# Patient Record
Sex: FEMALE | Race: WHITE | Employment: OTHER | URBAN - METROPOLITAN AREA
[De-identification: names, ages, dates, MRNs, and addresses within clinical notes are randomized per-mention and may not be internally consistent; named-entity substitution may affect disease eponyms.]

---

## 2017-02-07 ENCOUNTER — APPOINTMENT (OUTPATIENT)
Dept: CT IMAGING | Facility: HOSPITAL | Age: 82
End: 2017-02-07
Attending: EMERGENCY MEDICINE
Payer: MEDICARE

## 2017-02-07 ENCOUNTER — PRIOR ORIGINAL RECORDS (OUTPATIENT)
Dept: OTHER | Age: 82
End: 2017-02-07

## 2017-02-07 ENCOUNTER — APPOINTMENT (OUTPATIENT)
Dept: CV DIAGNOSTICS | Facility: HOSPITAL | Age: 82
End: 2017-02-07
Attending: HOSPITALIST
Payer: MEDICARE

## 2017-02-07 ENCOUNTER — HOSPITAL ENCOUNTER (OUTPATIENT)
Facility: HOSPITAL | Age: 82
Setting detail: OBSERVATION
Discharge: HOME | End: 2017-02-08
Attending: EMERGENCY MEDICINE | Admitting: INTERNAL MEDICINE
Payer: MEDICARE

## 2017-02-07 ENCOUNTER — APPOINTMENT (OUTPATIENT)
Dept: GENERAL RADIOLOGY | Facility: HOSPITAL | Age: 82
End: 2017-02-07
Payer: MEDICARE

## 2017-02-07 DIAGNOSIS — R55 SYNCOPE, UNSPECIFIED SYNCOPE TYPE: Primary | ICD-10-CM

## 2017-02-07 PROBLEM — Z91.81 AT RISK FOR FALLING: Status: ACTIVE | Noted: 2017-02-07

## 2017-02-07 LAB
ALBUMIN SERPL-MCNC: 3.2 G/DL (ref 3.5–4.8)
ALP LIVER SERPL-CCNC: 74 U/L (ref 55–142)
ALT SERPL-CCNC: 42 U/L (ref 14–54)
AST SERPL-CCNC: 30 U/L (ref 15–41)
ATRIAL RATE: 53 BPM
BASOPHILS # BLD AUTO: 0.03 X10(3) UL (ref 0–0.1)
BASOPHILS NFR BLD AUTO: 0.5 %
BILIRUB SERPL-MCNC: 1.1 MG/DL (ref 0.1–2)
BILIRUB UR QL STRIP.AUTO: NEGATIVE
BUN BLD-MCNC: 33 MG/DL (ref 8–20)
CALCIUM BLD-MCNC: 9.6 MG/DL (ref 8.3–10.3)
CHLORIDE: 106 MMOL/L (ref 101–111)
CO2: 30 MMOL/L (ref 22–32)
COLOR UR AUTO: YELLOW
CREAT BLD-MCNC: 1.41 MG/DL (ref 0.55–1.02)
EOSINOPHIL # BLD AUTO: 0.14 X10(3) UL (ref 0–0.3)
EOSINOPHIL NFR BLD AUTO: 2.4 %
ERYTHROCYTE [DISTWIDTH] IN BLOOD BY AUTOMATED COUNT: 13.2 % (ref 11.5–16)
GLUCOSE BLD-MCNC: 135 MG/DL (ref 70–99)
GLUCOSE UR STRIP.AUTO-MCNC: NEGATIVE MG/DL
HCT VFR BLD AUTO: 40.2 % (ref 34–50)
HGB BLD-MCNC: 13.1 G/DL (ref 12–16)
IMMATURE GRANULOCYTE COUNT: 0.02 X10(3) UL (ref 0–1)
IMMATURE GRANULOCYTE RATIO %: 0.3 %
KETONES UR STRIP.AUTO-MCNC: NEGATIVE MG/DL
LYMPHOCYTES # BLD AUTO: 2.09 X10(3) UL (ref 0.9–4)
LYMPHOCYTES NFR BLD AUTO: 36 %
M PROTEIN MFR SERPL ELPH: 6.4 G/DL (ref 6.1–8.3)
MCH RBC QN AUTO: 30.7 PG (ref 27–33.2)
MCHC RBC AUTO-ENTMCNC: 32.6 G/DL (ref 31–37)
MCV RBC AUTO: 94.1 FL (ref 81–100)
MONOCYTES # BLD AUTO: 0.26 X10(3) UL (ref 0.1–0.6)
MONOCYTES NFR BLD AUTO: 4.5 %
NEUTROPHIL ABS PRELIM: 3.27 X10 (3) UL (ref 1.3–6.7)
NEUTROPHILS # BLD AUTO: 3.27 X10(3) UL (ref 1.3–6.7)
NEUTROPHILS NFR BLD AUTO: 56.3 %
NITRITE UR QL STRIP.AUTO: POSITIVE
P AXIS: 63 DEGREES
P-R INTERVAL: 166 MS
PH UR STRIP.AUTO: 7 [PH] (ref 4.5–8)
PLATELET # BLD AUTO: 300 10(3)UL (ref 150–450)
POTASSIUM SERPL-SCNC: 3.8 MMOL/L (ref 3.6–5.1)
PROT UR STRIP.AUTO-MCNC: NEGATIVE MG/DL
Q-T INTERVAL: 452 MS
QRS DURATION: 102 MS
QTC CALCULATION (BEZET): 424 MS
R AXIS: -56 DEGREES
RBC # BLD AUTO: 4.27 X10(6)UL (ref 3.8–5.1)
RBC #/AREA URNS AUTO: >10 /HPF
RBC UR QL AUTO: NEGATIVE
RED CELL DISTRIBUTION WIDTH-SD: 45.8 FL (ref 35.1–46.3)
SODIUM SERPL-SCNC: 141 MMOL/L (ref 136–144)
SP GR UR STRIP.AUTO: 1.01 (ref 1–1.03)
T AXIS: 65 DEGREES
TROPONIN: <0.046 NG/ML (ref ?–0.05)
UROBILINOGEN UR STRIP.AUTO-MCNC: <2 MG/DL
VENTRICULAR RATE: 53 BPM
WBC # BLD AUTO: 5.8 X10(3) UL (ref 4–13)
WBC #/AREA URNS AUTO: >50 /HPF

## 2017-02-07 PROCEDURE — 71010 XR CHEST AP PORTABLE  (CPT=71010): CPT

## 2017-02-07 PROCEDURE — 70450 CT HEAD/BRAIN W/O DYE: CPT

## 2017-02-07 PROCEDURE — 93306 TTE W/DOPPLER COMPLETE: CPT | Performed by: INTERNAL MEDICINE

## 2017-02-07 PROCEDURE — 93306 TTE W/DOPPLER COMPLETE: CPT

## 2017-02-07 PROCEDURE — 99220 INITIAL OBSERVATION CARE,LEVL III: CPT | Performed by: HOSPITALIST

## 2017-02-07 RX ORDER — MAGNESIUM OXIDE 400 MG (241.3 MG MAGNESIUM) TABLET
400 TABLET DAILY
COMMUNITY

## 2017-02-07 RX ORDER — ATORVASTATIN CALCIUM 10 MG/1
10 TABLET, FILM COATED ORAL DAILY
Status: DISCONTINUED | OUTPATIENT
Start: 2017-02-08 | End: 2017-02-08

## 2017-02-07 RX ORDER — FELODIPINE 10 MG/1
10 TABLET, EXTENDED RELEASE ORAL DAILY
Status: ON HOLD | COMMUNITY
End: 2017-02-08

## 2017-02-07 RX ORDER — OMEGA-3S/DHA/EPA/FISH OIL 980-1400MG
1000 CAPSULE,DELAYED RELEASE (ENTERIC COATED) ORAL DAILY
COMMUNITY

## 2017-02-07 RX ORDER — FLUOROMETHOLONE 0.1 %
1 SUSPENSION, DROPS(FINAL DOSAGE FORM)(ML) OPHTHALMIC (EYE) 2 TIMES DAILY
Status: DISCONTINUED | OUTPATIENT
Start: 2017-02-07 | End: 2017-02-08

## 2017-02-07 RX ORDER — ENOXAPARIN SODIUM 100 MG/ML
30 INJECTION SUBCUTANEOUS DAILY
Status: DISCONTINUED | OUTPATIENT
Start: 2017-02-08 | End: 2017-02-08

## 2017-02-07 RX ORDER — BENAZEPRIL HYDROCHLORIDE AND HYDROCHLOROTHIAZIDE 20; 12.5 MG/1; MG/1
1 TABLET ORAL DAILY
Status: ON HOLD | COMMUNITY
End: 2017-02-08

## 2017-02-07 RX ORDER — ATORVASTATIN CALCIUM 10 MG/1
10 TABLET, FILM COATED ORAL EVERY EVENING
COMMUNITY

## 2017-02-07 RX ORDER — SODIUM CHLORIDE 9 MG/ML
INJECTION, SOLUTION INTRAVENOUS CONTINUOUS
Status: DISCONTINUED | OUTPATIENT
Start: 2017-02-07 | End: 2017-02-08

## 2017-02-07 RX ORDER — FLUOROMETHOLONE 0.1 %
1 SUSPENSION, DROPS(FINAL DOSAGE FORM)(ML) OPHTHALMIC (EYE) 2 TIMES DAILY
COMMUNITY

## 2017-02-07 RX ORDER — METOPROLOL SUCCINATE 25 MG/1
25 TABLET, EXTENDED RELEASE ORAL DAILY
Status: ON HOLD | COMMUNITY
End: 2017-02-08

## 2017-02-07 RX ORDER — POTASSIUM CHLORIDE 20 MEQ/1
40 TABLET, EXTENDED RELEASE ORAL ONCE
Status: COMPLETED | OUTPATIENT
Start: 2017-02-07 | End: 2017-02-07

## 2017-02-07 RX ORDER — MULTIVITAMIN/IRON/FOLIC ACID 18MG-0.4MG
1 TABLET ORAL DAILY
COMMUNITY
End: 2019-01-11

## 2017-02-07 RX ORDER — ONDANSETRON 2 MG/ML
4 INJECTION INTRAMUSCULAR; INTRAVENOUS EVERY 4 HOURS PRN
Status: DISCONTINUED | OUTPATIENT
Start: 2017-02-07 | End: 2017-02-08

## 2017-02-07 RX ORDER — SODIUM CHLORIDE 9 MG/ML
INJECTION, SOLUTION INTRAVENOUS ONCE
Status: COMPLETED | OUTPATIENT
Start: 2017-02-07 | End: 2017-02-07

## 2017-02-07 RX ORDER — DEXTROSE AND SODIUM CHLORIDE 5; .45 G/100ML; G/100ML
INJECTION, SOLUTION INTRAVENOUS CONTINUOUS
Status: ACTIVE | OUTPATIENT
Start: 2017-02-07 | End: 2017-02-07

## 2017-02-07 NOTE — ED INITIAL ASSESSMENT (HPI)
Pt had flu like symptoms 2 weeks ago and felt lightheaded and passed out for just a couple of seconds and then when being carried to her bed she had another syncopal event. That episode lasted a couple seconds as well.   Pt had another episode today while g

## 2017-02-07 NOTE — CONSULTS
[facility]  Report of Consultation    Sam Piña Patient Status:  Observation    1932 MRN [id number]   [facility] 0SW-A Attending Chong Parsons, DO   Hosp Day # 0 PCP None Pcp     Reason for Co dextrose 5 %-0.45 % NaCl infusion, , Intravenous, Continuous  •  ondansetron HCl (ZOFRAN) injection 4 mg, 4 mg, Intravenous, Q4H PRN  •  [START ON 2/8/2017] Atorvastatin Calcium (LIPITOR) tab 10 mg, 10 mg, Oral, Daily  •  fluorometholone (FML LIQUIFILM) 0.

## 2017-02-07 NOTE — H&P
[facility]  History and Physical     Erleen Ill Patient Status:  Observation    1932 MRN [id number]   [facility] 0SW-A Attending Leighann Thomas, DO   Hosp Day # 0 PCP None Pcp     Chief Complaint: syncope    History of P to encounter. Review of Systems:   A comprehensive 14 point review of systems was completed. Pertinent positives and negatives noted in the HPI. Physical Exam:    /54 mmHg  Pulse 56  Resp 16  SpO2 97%  General: No acute distress.  Alert and o

## 2017-02-07 NOTE — PLAN OF CARE
Patient admitted from ED  Aox4 in NAD; very [address]; deaf rt ear; hearing aid left ear  Orthostatics       Flat 137/46 HR 52       Sit     131/51 HR 59      Stand 112/46 HR 62; denies dizziness    Admit database completed  Patient verbalizes how sad she has be

## 2017-02-08 ENCOUNTER — APPOINTMENT (OUTPATIENT)
Dept: CV DIAGNOSTICS | Facility: HOSPITAL | Age: 82
End: 2017-02-08
Attending: INTERNAL MEDICINE
Payer: MEDICARE

## 2017-02-08 VITALS
BODY MASS INDEX: 18.82 KG/M2 | DIASTOLIC BLOOD PRESSURE: 49 MMHG | HEIGHT: 64 IN | TEMPERATURE: 98 F | RESPIRATION RATE: 18 BRPM | HEART RATE: 44 BPM | SYSTOLIC BLOOD PRESSURE: 135 MMHG | WEIGHT: 110.25 LBS | OXYGEN SATURATION: 96 %

## 2017-02-08 LAB
BUN BLD-MCNC: 23 MG/DL (ref 8–20)
CALCIUM BLD-MCNC: 8.6 MG/DL (ref 8.3–10.3)
CHLORIDE: 113 MMOL/L (ref 101–111)
CO2: 23 MMOL/L (ref 22–32)
CREAT BLD-MCNC: 1.07 MG/DL (ref 0.55–1.02)
GLUCOSE BLD-MCNC: 79 MG/DL (ref 70–99)
POTASSIUM SERPL-SCNC: 4.2 MMOL/L (ref 3.6–5.1)
SODIUM SERPL-SCNC: 143 MMOL/L (ref 136–144)

## 2017-02-08 PROCEDURE — 93017 CV STRESS TEST TRACING ONLY: CPT

## 2017-02-08 PROCEDURE — 99217 OBSERVATION CARE DISCHARGE: CPT | Performed by: HOSPITALIST

## 2017-02-08 PROCEDURE — 78452 HT MUSCLE IMAGE SPECT MULT: CPT

## 2017-02-08 PROCEDURE — 93018 CV STRESS TEST I&R ONLY: CPT | Performed by: INTERNAL MEDICINE

## 2017-02-08 RX ORDER — METOPROLOL SUCCINATE 25 MG/1
25 TABLET, EXTENDED RELEASE ORAL DAILY
Refills: 0 | Status: ON HOLD | COMMUNITY
Start: 2017-02-08 | End: 2017-04-19

## 2017-02-08 NOTE — PLAN OF CARE
Maintains optimal cardiac output and hemodynamic stability Progressing      Absence of cardiac arrhythmias or at baseline Progressing      Back from stress test, pt awake,alert  Left hearing aid noted, right ear deaf  Denies c/p, no sob  SB on the monitor

## 2017-02-08 NOTE — PLAN OF CARE
HR drop to 39 per MT  Landon IRENE made aware, will review the rhythm strip  Spoke with Jus Smith from cardiographics   No available 30 day event monitor for pt today  Continue to monitor

## 2017-02-08 NOTE — PROGRESS NOTES
[facility] Pharmacy Note:  Renal Dose Adjustment    Zoie Wood has been prescribed enoxaparin (LOVENOX) 40 mg subcutaneously every 24 hours. Estimated Creatinine Clearance: 23 mL/min (based on Cr of 1.41).     Her calculated creatinine clearance is <30 m

## 2017-02-08 NOTE — PLAN OF CARE
NURSING DISCHARGE NOTE    Discharged Home via Wheelchair. Accompanied by Support staff  Belongings Taken by patient/family.   D/c instructions given to pt and daughter,verbalized understanding  OP 30 day event monitor, scheduling # given to pt [phone]

## 2017-02-08 NOTE — PROGRESS NOTES
CARDIODIAGNOSTICS PRELIMINARY REPORT    Lexiscan Nuclear Stress Test    Patient denied cardiac symptoms with Lexiscan infusion. No significant EKG changes or arrhytmias noted. Nuclear images pending.

## 2017-02-08 NOTE — PROGRESS NOTES
[facility]  Cardiology Progress Note    Subjective:  No chest pain or shortness of breath. Denies any dizziness or lightheadedness.     Objective:  /57 mmHg  Pulse 55  Temp(Src) 97.3 °F (36.3 °C) (Oral)  Resp 18  Ht 5' 4\" (1.626 m)  Wt 110 lb 3 outlined above.     TETO Cuevas

## 2017-02-09 NOTE — DISCHARGE SUMMARY
[facility] HOSPITALIST  DISCHARGE SUMMARY     Cheryl Yu Patient Status:  Observation    1932 MRN [id number]   [facility] 0SW-A Attending No att. providers found   Hosp Day # 1 PCP None Pcp     Date of Admission: 2017  Date of Mariel Villalta injury which is felt to be prerenal in etiology. She was started on IV fluids with resolution of her kidney dysfunction. It is felt that the patient's syncopal episode is likely secondary to dehydration.   She recently had a viral URI and had decreased or known as:  PLENDIL                 Follow-up appointment: Minda Russ MD  [address] [phone]    Schedule an appointment as soon as possible for a visit in 2 weeks  see APN in offic

## 2017-02-17 ENCOUNTER — HOSPITAL ENCOUNTER (OUTPATIENT)
Dept: CV DIAGNOSTICS | Facility: HOSPITAL | Age: 82
Discharge: HOME | End: 2017-02-17
Attending: INTERNAL MEDICINE
Payer: MEDICARE

## 2017-02-17 DIAGNOSIS — R55 SYNCOPE, UNSPECIFIED SYNCOPE TYPE: ICD-10-CM

## 2017-02-17 PROCEDURE — 93272 ECG/REVIEW INTERPRET ONLY: CPT | Performed by: INTERNAL MEDICINE

## 2017-02-17 PROCEDURE — 93270 REMOTE 30 DAY ECG REV/REPORT: CPT

## 2017-02-21 ENCOUNTER — MYAURORA ACCOUNT LINK (OUTPATIENT)
Dept: OTHER | Age: 82
End: 2017-02-21

## 2017-02-21 ENCOUNTER — PRIOR ORIGINAL RECORDS (OUTPATIENT)
Dept: OTHER | Age: 82
End: 2017-02-21

## 2017-02-24 LAB
ALBUMIN: 3.2 G/DL
ALKALINE PHOSPHATATE(ALK PHOS): 74 IU/L
BILIRUBIN TOTAL: 1.1 MG/DL
BUN: 33 MG/DL
CALCIUM: 9.6 MG/DL
CHLORIDE: 106 MEQ/L
CREATININE, SERUM: 1.41 MG/DL
GLUCOSE: 135 MG/DL
HEMATOCRIT: 40.2 %
HEMOGLOBIN: 13.1 G/DL
PLATELETS: 300 K/UL
POTASSIUM, SERUM: 3.8 MEQ/L
PROTEIN, TOTAL: 6.4 G/DL
RED BLOOD COUNT: 4.27 X 10-6/U
SGOT (AST): 30 IU/L
SGPT (ALT): 42 IU/L
SODIUM: 141 MEQ/L
WHITE BLOOD COUNT: 5.8 X 10-3/U

## 2017-02-27 ENCOUNTER — PRIOR ORIGINAL RECORDS (OUTPATIENT)
Dept: OTHER | Age: 82
End: 2017-02-27

## 2017-03-24 ENCOUNTER — PRIOR ORIGINAL RECORDS (OUTPATIENT)
Dept: OTHER | Age: 82
End: 2017-03-24

## 2017-03-29 ENCOUNTER — HOSPITAL ENCOUNTER (OUTPATIENT)
Dept: CT IMAGING | Facility: HOSPITAL | Age: 82
Discharge: HOME | End: 2017-03-29
Attending: INTERNAL MEDICINE
Payer: MEDICARE

## 2017-03-29 DIAGNOSIS — R55 SYNCOPE, UNSPECIFIED SYNCOPE TYPE: ICD-10-CM

## 2017-03-29 PROCEDURE — 70450 CT HEAD/BRAIN W/O DYE: CPT

## 2017-03-30 ENCOUNTER — PRIOR ORIGINAL RECORDS (OUTPATIENT)
Dept: OTHER | Age: 82
End: 2017-03-30

## 2017-03-31 ENCOUNTER — PRIOR ORIGINAL RECORDS (OUTPATIENT)
Dept: OTHER | Age: 82
End: 2017-03-31

## 2017-04-05 ENCOUNTER — OFFICE VISIT (OUTPATIENT)
Dept: NEUROLOGY | Facility: CLINIC | Age: 82
End: 2017-04-05

## 2017-04-05 VITALS
SYSTOLIC BLOOD PRESSURE: 176 MMHG | DIASTOLIC BLOOD PRESSURE: 80 MMHG | WEIGHT: 116 LBS | BODY MASS INDEX: 21.35 KG/M2 | HEIGHT: 62 IN | HEART RATE: 60 BPM | RESPIRATION RATE: 16 BRPM

## 2017-04-05 DIAGNOSIS — M54.2 NECK PAIN: ICD-10-CM

## 2017-04-05 DIAGNOSIS — R55 SYNCOPE, UNSPECIFIED SYNCOPE TYPE: Primary | ICD-10-CM

## 2017-04-05 PROCEDURE — 99204 OFFICE O/P NEW MOD 45 MIN: CPT | Performed by: OTHER

## 2017-04-05 NOTE — PROGRESS NOTES
[facility] CONSULTATION    Date of consult: 4/5/2017    CC: \"heaviness in head\", syncope    HPI: Lynn Riggs is a 80year old female with past medical history as listed below presents here for initial evaluation of \"heaviness sensati EARDRUM,ASPIR Right     TONSILLECTOMY       Social History:     Smoking status: Never Smoker     Smokeless tobacco: Not on file    Alcohol Use: No     Family history:  Patient denies any pertinent family history associated with the current problem    Physi 4:09 PM  None Pcp

## 2017-04-05 NOTE — PATIENT INSTRUCTIONS
Refill policies:    • Allow 2 business days for refills; controlled substances may take longer.   • Contact your pharmacy at least 5 days prior to running out of medication and have them send an electronic request or submit request through the “request re insurance carrier to obtain pre-certification or prior authorization. Unfortunately, [facility] has seen an increase in denial of payment even though the procedure/test has been pre-certified.   You are strongly encouraged to contact your insurance carrier to v

## 2017-04-10 ENCOUNTER — HOSPITAL ENCOUNTER (OUTPATIENT)
Dept: MRI IMAGING | Facility: HOSPITAL | Age: 82
Discharge: HOME | End: 2017-04-10
Attending: Other
Payer: MEDICARE

## 2017-04-10 DIAGNOSIS — M54.2 NECK PAIN: ICD-10-CM

## 2017-04-10 DIAGNOSIS — R55 SYNCOPE, UNSPECIFIED SYNCOPE TYPE: ICD-10-CM

## 2017-04-10 PROCEDURE — 72141 MRI NECK SPINE W/O DYE: CPT

## 2017-04-10 PROCEDURE — 70551 MRI BRAIN STEM W/O DYE: CPT

## 2017-04-12 NOTE — H&P
: 1932  ACCOUNT:  [id number]  [phone]  PCP:      TODAY'S DATE: 2017  DICTATED BY:  Shadia Parra M.D.]    CHIEF COMPLAINT: [Followup of Bradycardia, Followup of Fatigue/malaise and Followup of Syncope.]    HPI:  [On 2017, Solo Mcknight checks her blood pressure and heart rate fairly regularly and states that the majority of her heart rates are in the 40s. She is only on a low-dose beta blocker at this point.   She has not really complained of any recurrent symptoms of syncope since being HEAD/FACE: no trauma and normocephalic. EYES: conjunctivae not injected and no xanthelasma. ENT: mucosa pink and moist. NECK: jugular venous pressure not elevated. RESP: respirations with normal rate and rhythm, clear to auscultation.  GI: no masses, tender procedure, we will increase her beta blocker further, which will also help with blood pressure control. 2. Unclear etiology of the headaches. As stated above, these did not seem to correspond with her arrhythmias on the event monitor.   I am going to ge

## 2017-04-18 ENCOUNTER — APPOINTMENT (OUTPATIENT)
Dept: GENERAL RADIOLOGY | Facility: HOSPITAL | Age: 82
End: 2017-04-18
Attending: INTERNAL MEDICINE
Payer: MEDICARE

## 2017-04-18 ENCOUNTER — HOSPITAL ENCOUNTER (OUTPATIENT)
Dept: INTERVENTIONAL RADIOLOGY/VASCULAR | Facility: HOSPITAL | Age: 82
Discharge: HOME | End: 2017-04-19
Attending: INTERNAL MEDICINE | Admitting: INTERNAL MEDICINE
Payer: MEDICARE

## 2017-04-18 DIAGNOSIS — R53.83 FATIGUE: ICD-10-CM

## 2017-04-18 DIAGNOSIS — R55 SYNCOPE: ICD-10-CM

## 2017-04-18 DIAGNOSIS — R00.1 BRADYCARDIA: ICD-10-CM

## 2017-04-18 DIAGNOSIS — I47.1 SVT (SUPRAVENTRICULAR TACHYCARDIA) (HCC): ICD-10-CM

## 2017-04-18 PROCEDURE — 02HK3JZ INSERTION OF PACEMAKER LEAD INTO RIGHT VENTRICLE, PERCUTANEOUS APPROACH: ICD-10-PCS | Performed by: INTERNAL MEDICINE

## 2017-04-18 PROCEDURE — 02H63JZ INSERTION OF PACEMAKER LEAD INTO RIGHT ATRIUM, PERCUTANEOUS APPROACH: ICD-10-PCS | Performed by: INTERNAL MEDICINE

## 2017-04-18 PROCEDURE — 0JH606Z INSERTION OF PACEMAKER, DUAL CHAMBER INTO CHEST SUBCUTANEOUS TISSUE AND FASCIA, OPEN APPROACH: ICD-10-PCS | Performed by: INTERNAL MEDICINE

## 2017-04-18 PROCEDURE — 99152 MOD SED SAME PHYS/QHP 5/>YRS: CPT | Performed by: INTERNAL MEDICINE

## 2017-04-18 PROCEDURE — 99153 MOD SED SAME PHYS/QHP EA: CPT | Performed by: INTERNAL MEDICINE

## 2017-04-18 PROCEDURE — 71010 XR CHEST AP PORTABLE  (CPT=71010): CPT

## 2017-04-18 PROCEDURE — 33208 INSRT HEART PM ATRIAL & VENT: CPT | Performed by: INTERNAL MEDICINE

## 2017-04-18 RX ORDER — BACITRACIN 50000 [USP'U]/1
INJECTION, POWDER, LYOPHILIZED, FOR SOLUTION INTRAMUSCULAR
Status: COMPLETED
Start: 2017-04-18 | End: 2017-04-18

## 2017-04-18 RX ORDER — MAGNESIUM OXIDE 400 MG (241.3 MG MAGNESIUM) TABLET
400 TABLET DAILY
Status: DISCONTINUED | OUTPATIENT
Start: 2017-04-18 | End: 2017-04-19

## 2017-04-18 RX ORDER — SODIUM CHLORIDE 9 MG/ML
INJECTION, SOLUTION INTRAVENOUS CONTINUOUS
Status: DISCONTINUED | OUTPATIENT
Start: 2017-04-18 | End: 2017-04-19

## 2017-04-18 RX ORDER — ATORVASTATIN CALCIUM 10 MG/1
10 TABLET, FILM COATED ORAL DAILY
Status: DISCONTINUED | OUTPATIENT
Start: 2017-04-18 | End: 2017-04-19

## 2017-04-18 RX ORDER — MIDAZOLAM HYDROCHLORIDE 1 MG/ML
INJECTION INTRAMUSCULAR; INTRAVENOUS
Status: COMPLETED
Start: 2017-04-18 | End: 2017-04-18

## 2017-04-18 RX ORDER — METOPROLOL SUCCINATE 25 MG/1
25 TABLET, EXTENDED RELEASE ORAL DAILY
Status: DISCONTINUED | OUTPATIENT
Start: 2017-04-18 | End: 2017-04-19

## 2017-04-18 RX ORDER — ONDANSETRON 2 MG/ML
4 INJECTION INTRAMUSCULAR; INTRAVENOUS EVERY 6 HOURS PRN
Status: DISCONTINUED | OUTPATIENT
Start: 2017-04-18 | End: 2017-04-19

## 2017-04-18 RX ORDER — LIDOCAINE HYDROCHLORIDE 10 MG/ML
INJECTION, SOLUTION INFILTRATION; PERINEURAL
Status: COMPLETED
Start: 2017-04-18 | End: 2017-04-18

## 2017-04-18 RX ORDER — DIPHENHYDRAMINE HYDROCHLORIDE 50 MG/ML
INJECTION INTRAMUSCULAR; INTRAVENOUS
Status: COMPLETED
Start: 2017-04-18 | End: 2017-04-18

## 2017-04-18 RX ORDER — CHLORHEXIDINE GLUCONATE 4 G/100ML
30 SOLUTION TOPICAL
Status: DISCONTINUED | OUTPATIENT
Start: 2017-04-19 | End: 2017-04-18 | Stop reason: HOSPADM

## 2017-04-18 RX ADMIN — SODIUM CHLORIDE: 9 INJECTION, SOLUTION INTRAVENOUS at 15:00:00

## 2017-04-18 NOTE — PROCEDURES
OPERATION(S) PERFORMED:   1. Dual-chamber pacemaker implant to preserve AV synchrony and prevent pacemaker syndrome. 2. Chest fluoroscopy. 3. Left upper extremity venography.      : Radha Gutierrez MD    INDICATION: 79 y/o female with HTN and sympt The leads were connected to a pulse generator. They were coiled and placed into the pocket along with the pulse generator. The incision was closed in 3 layers using 2-0, 3-0, and 4-0 Vicryl. Steri-Strips were applied followed by a sterile dressing.  The le

## 2017-04-18 NOTE — H&P
[facility]  H&P    Larri Habermann Patient Status:  Outpatient in a Bed    1932 MRN [id number]   Location [address] [facility] Attending Breana Lopez MD   Hosp Day # 0 PCP None Pcp     Assessme data recorded. Wt Readings from Last 3 Encounters:  04/12/17 : 116 lb (52.617 kg)  04/05/17 : 116 lb (52.617 kg)  02/07/17 : 110 lb 3.7 oz (50 kg)      Telemetry: Sinus elaine  General: Alert and oriented in no apparent distress.   HEENT: No focal deficit

## 2017-04-19 ENCOUNTER — APPOINTMENT (OUTPATIENT)
Dept: GENERAL RADIOLOGY | Facility: HOSPITAL | Age: 82
End: 2017-04-19
Attending: INTERNAL MEDICINE
Payer: MEDICARE

## 2017-04-19 VITALS
WEIGHT: 116 LBS | HEART RATE: 61 BPM | TEMPERATURE: 98 F | HEIGHT: 62 IN | DIASTOLIC BLOOD PRESSURE: 70 MMHG | BODY MASS INDEX: 21.35 KG/M2 | OXYGEN SATURATION: 95 % | SYSTOLIC BLOOD PRESSURE: 140 MMHG | RESPIRATION RATE: 18 BRPM

## 2017-04-19 PROCEDURE — 93005 ELECTROCARDIOGRAM TRACING: CPT

## 2017-04-19 PROCEDURE — 71020 XR CHEST PA + LAT CHEST (CPT=71020): CPT

## 2017-04-19 PROCEDURE — 93010 ELECTROCARDIOGRAM REPORT: CPT | Performed by: INTERNAL MEDICINE

## 2017-04-19 RX ORDER — ACETAMINOPHEN AND CODEINE PHOSPHATE 300; 30 MG/1; MG/1
2 TABLET ORAL EVERY 4 HOURS PRN
Status: DISCONTINUED | OUTPATIENT
Start: 2017-04-19 | End: 2017-04-19

## 2017-04-19 RX ORDER — METOPROLOL SUCCINATE 25 MG/1
50 TABLET, EXTENDED RELEASE ORAL DAILY
Refills: 0 | Status: SHIPPED | COMMUNITY
Start: 2017-04-19 | End: 2017-05-12 | Stop reason: DRUGHIGH

## 2017-04-19 RX ORDER — ACETAMINOPHEN AND CODEINE PHOSPHATE 300; 30 MG/1; MG/1
1 TABLET ORAL EVERY 4 HOURS PRN
Status: DISCONTINUED | OUTPATIENT
Start: 2017-04-19 | End: 2017-04-19

## 2017-04-19 RX ADMIN — METOPROLOL SUCCINATE 25 MG: 25 TABLET, EXTENDED RELEASE ORAL at 08:50:00

## 2017-04-19 RX ADMIN — ATORVASTATIN CALCIUM 10 MG: 10 TABLET, FILM COATED ORAL at 08:49:00

## 2017-04-19 RX ADMIN — MAGNESIUM OXIDE 400 MG (241.3 MG MAGNESIUM) TABLET 400 MG: TABLET at 08:49:00

## 2017-04-19 RX ADMIN — ACETAMINOPHEN AND CODEINE PHOSPHATE 1 TABLET: 300; 30 TABLET ORAL at 08:49:00

## 2017-04-19 NOTE — PROGRESS NOTES
[facility] Cardiology Progress Note    Subjective:   Sore at pacer site. Having some mild pain in her L eye, and her head feels heavy.      Objective:  /60 mmHg  Pulse 63  Temp(Src) 97.8 °F (36.6 °C) (Oral)  Resp 18  Ht 5' 2\" (1.575 m)  Wt 116 lb (52.61

## 2017-04-19 NOTE — PLAN OF CARE
S/p PPM placement to left chest.  Pacemaker site with foam dressing intact. No drainage. Surrounding site is soft. Sling is applied to the left arm with limb restrictions in place. Patient and daughter educated limb restrictions.  Patient drowsy and fat

## 2017-04-19 NOTE — PROGRESS NOTES
Care of patient taken over by me at 1800. Left chest dry and intact. Pt. Sitting up in bed eating and drinking. Sling intact to left arm. Awaiting a tele bed for admit.

## 2017-04-19 NOTE — PLAN OF CARE
CARDIOVASCULAR - ADULT    • Maintains optimal cardiac output and hemodynamic stability Adequate for Discharge    • Absence of cardiac arrhythmias or at baseline Adequate for Discharge

## 2017-04-19 NOTE — PLAN OF CARE
NURSING DISCHARGE NOTE    Discharged 1190 via wheelchair. Accompanied by daughter  Belongings all sent. All discharge instructions reviewed. All questions answered.    meiplex removed per orders from [address] Breana Badillo APSAVANNA, site looks C/D/I. mepiplex [place]

## 2017-04-19 NOTE — PROGRESS NOTES
Report given to [email or web address] [facility]. Pt. Transferred to [address] [place] [address] via wheelchair. Bedside handoff given. Left chest dressing remains dry and intact.

## 2017-04-26 ENCOUNTER — MYAURORA ACCOUNT LINK (OUTPATIENT)
Dept: OTHER | Age: 82
End: 2017-04-26

## 2017-04-26 ENCOUNTER — PRIOR ORIGINAL RECORDS (OUTPATIENT)
Dept: OTHER | Age: 82
End: 2017-04-26

## 2017-05-12 ENCOUNTER — OFFICE VISIT (OUTPATIENT)
Dept: NEUROLOGY | Facility: CLINIC | Age: 82
End: 2017-05-12

## 2017-05-12 VITALS
BODY MASS INDEX: 21 KG/M2 | HEART RATE: 70 BPM | DIASTOLIC BLOOD PRESSURE: 72 MMHG | SYSTOLIC BLOOD PRESSURE: 134 MMHG | RESPIRATION RATE: 14 BRPM | WEIGHT: 115 LBS

## 2017-05-12 DIAGNOSIS — M54.2 NECK PAIN: Primary | ICD-10-CM

## 2017-05-12 DIAGNOSIS — R55 SYNCOPE, UNSPECIFIED SYNCOPE TYPE: ICD-10-CM

## 2017-05-12 PROCEDURE — 99215 OFFICE O/P EST HI 40 MIN: CPT | Performed by: OTHER

## 2017-05-12 RX ORDER — METOPROLOL SUCCINATE 50 MG/1
TABLET, EXTENDED RELEASE ORAL DAILY
Refills: 3 | COMMUNITY
Start: 2017-04-26

## 2017-05-12 NOTE — PROGRESS NOTES
Patient here to follow up regarding syncopal episodes. States she has been doing well. Here to discuss test results and next steps.

## 2017-05-12 NOTE — PROGRESS NOTES
[facility] Neurology outpatient progress note  Date of service: 5/12/2017    Patient here for a follow-up visit for \"heaviness in head\", syncope. Since last visit symptoms have resolved, pacemaker was placed. MRI, brain and c spine were unremarkable.   Zoie INCISION EARDRUM,ASPIR Right     TONSILLECTOMY      CARDIAC PACEMAKER PLACEMENT Left 4/18/17    Comment medtronic MRI conditional dual chamber     Social History:     Smoking status: Never Smoker     Smokeless tobacco: Never Used    Alcohol Use: No     Fam

## 2017-08-03 NOTE — PLAN OF CARE
CARDIOVASCULAR - ADULT    • Maintains optimal cardiac output and hemodynamic stability Progressing    • Absence of cardiac arrhythmias or at baseline Progressing              A/o x3 ? Forgetfullness. [address]  Lives with daughter. Daughter at bedside.    Up and Internal Medicine

## 2017-09-15 ENCOUNTER — PRIOR ORIGINAL RECORDS (OUTPATIENT)
Dept: OTHER | Age: 82
End: 2017-09-15

## 2017-12-29 ENCOUNTER — MYAURORA ACCOUNT LINK (OUTPATIENT)
Dept: OTHER | Age: 82
End: 2017-12-29

## 2018-05-25 ENCOUNTER — OFFICE VISIT (OUTPATIENT)
Dept: INTERNAL MEDICINE CLINIC | Facility: CLINIC | Age: 83
End: 2018-05-25

## 2018-05-25 VITALS
HEART RATE: 66 BPM | RESPIRATION RATE: 16 BRPM | DIASTOLIC BLOOD PRESSURE: 70 MMHG | BODY MASS INDEX: 23 KG/M2 | WEIGHT: 125 LBS | TEMPERATURE: 99 F | HEIGHT: 62 IN | SYSTOLIC BLOOD PRESSURE: 130 MMHG | OXYGEN SATURATION: 98 %

## 2018-05-25 DIAGNOSIS — E78.2 MIXED HYPERLIPIDEMIA: ICD-10-CM

## 2018-05-25 DIAGNOSIS — Z00.00 PHYSICAL EXAM, ANNUAL: Primary | ICD-10-CM

## 2018-05-25 DIAGNOSIS — I10 HYPERTENSION, ESSENTIAL: ICD-10-CM

## 2018-05-25 DIAGNOSIS — Z23 NEED FOR PNEUMOCOCCAL VACCINATION: ICD-10-CM

## 2018-05-25 DIAGNOSIS — F01.50 VASCULAR DEMENTIA WITHOUT BEHAVIORAL DISTURBANCE (HCC): ICD-10-CM

## 2018-05-25 DIAGNOSIS — I49.5 TACHY-BRADY SYNDROME (HCC): ICD-10-CM

## 2018-05-25 DIAGNOSIS — Z00.00 ENCOUNTER FOR ANNUAL HEALTH EXAMINATION: ICD-10-CM

## 2018-05-25 PROBLEM — R55 SYNCOPE: Status: RESOLVED | Noted: 2017-02-07 | Resolved: 2018-05-25

## 2018-05-25 PROBLEM — R55 SYNCOPE, UNSPECIFIED SYNCOPE TYPE: Status: RESOLVED | Noted: 2017-02-07 | Resolved: 2018-05-25

## 2018-05-25 PROBLEM — M54.2 NECK PAIN: Status: RESOLVED | Noted: 2017-04-05 | Resolved: 2018-05-25

## 2018-05-25 PROCEDURE — G0439 PPPS, SUBSEQ VISIT: HCPCS | Performed by: INTERNAL MEDICINE

## 2018-05-25 PROCEDURE — 90732 PPSV23 VACC 2 YRS+ SUBQ/IM: CPT | Performed by: INTERNAL MEDICINE

## 2018-05-25 PROCEDURE — G0009 ADMIN PNEUMOCOCCAL VACCINE: HCPCS | Performed by: INTERNAL MEDICINE

## 2018-05-25 RX ORDER — DONEPEZIL HYDROCHLORIDE 10 MG/1
10 TABLET, FILM COATED ORAL NIGHTLY
Qty: 30 TABLET | Refills: 1 | Status: SHIPPED | OUTPATIENT
Start: 2018-05-25 | End: 2019-08-05

## 2018-05-25 RX ORDER — DONEPEZIL HYDROCHLORIDE 10 MG/1
TABLET, FILM COATED ORAL
Qty: 90 TABLET | Refills: 1 | OUTPATIENT
Start: 2018-05-25

## 2018-05-25 NOTE — TELEPHONE ENCOUNTER
Rx not sent for a 90 day supply. The pt is to FU in the office in 4 weeks. Rx sent today for #30 and 1 refill.

## 2018-05-25 NOTE — PROGRESS NOTES
HPI:   Barrett Graham is a 80year old female who presents for a Medicare Subsequent Annual Wellness visit (Pt already had Initial Annual Wellness). HPI:  New pt to me  Pt is accompanied by daughter.  Per daughter pt has had increasing memory loss and pleasure in doing things (over the last two weeks)?: Not at all  Feeling down, depressed, or hopeless (over the last two weeks)?: Not at all  PHQ-2 SCORE: 0     Advanced Directive:   She does NOT have a Living Will on file in [address] [place] [address] Rd.    Advance care planning Taking for the 5/25/18 encounter (Office Visit) with Roland Lake MD:  aspirin 81 MG Oral Tab Take 1 tablet (81 mg total) by mouth daily. Donepezil HCl (ARICEPT) 10 MG Oral Tab Take 1 tablet (10 mg total) by mouth nightly.    Metoprolol Succinate ER 50 discharge or itching, no complaint of urinary incontinence   MUSCULOSKELETAL: denies back pain  NEURO: denies headaches  PSYCHE: denies depression or anxiety  HEMATOLOGIC: denies hx of anemia  ENDOCRINE: denies thyroid history  ALL/ASTHMA: denies hx of all symmetric   Skin: Skin color, texture, turgor normal, no rashes or lesions   Lymph nodes: Cervical, supraclavicular nodes normal   Neurologic: Nonfocal       Vaccination History     Immunization History   Administered Date(s) Administered   • HIGH DOSE FLU positive mental well-being?:  (reading)      This section provided for quick review of chart, separate sheet to patient  [address] Internal Lab or Procedure External Lab or Procedure   Diabetes Screening      HbgA1C found Please get once after your 65th birthday    Pneumococcal 23 (Pneumovax)  Covered Once after 65 05/25/2018 Please get once after your 65th birthday    Hepatitis B for Moderate/High Risk No vaccine history found Medium/high risk factors:   End-stage re

## 2018-05-25 NOTE — PATIENT INSTRUCTIONS
For Caregivers: Daily Care for Dementia Patients     Gardening can be a pleasant way to keep your loved one active. Over time, people with dementia will need more and more help with daily tasks.  These include eating meals, taking medicines, and getting · Place healthy snacks, such as fresh fruit, out where they can be seen. · Watch eating habits. People with dementia may eat too little or too much. Talk to the healthcare provider if you have concerns.   · Try finger foods if regular meals become too diff Call the healthcare provider if you notice a sudden change in your loved one’s behavior or emotions. These changes may be due to dementia. But they could also signal other health problems that can be treated.    Date Last Reviewed: 10/2/2015  NOTE:Sofía shee Abdominal aortic aneurysm screening (once between ages 73-68) IPPE only No results found for this or any previous visit.  Limited to patients who meet one of the following criteria:   • Men who are 73-68 years old and have smoked more than 100 cigarettes i Recommend Annually to at least age 76, and as needed after 76 There are no preventive care reminders to display for this patient.  Please get this Mammogram regularly   Immunizations      Influenza  Covered Annually   Orders placed or performed in visit on A link to the [employer]. This site has a lot of good information including definitions of the different types of Advance Directives.  It also has the State forms available on it's website for anyone to review and print using their home

## 2018-07-16 ENCOUNTER — MYAURORA ACCOUNT LINK (OUTPATIENT)
Dept: OTHER | Age: 83
End: 2018-07-16

## 2018-07-21 ENCOUNTER — APPOINTMENT (OUTPATIENT)
Dept: LAB | Facility: HOSPITAL | Age: 83
End: 2018-07-21
Attending: INTERNAL MEDICINE
Payer: MEDICARE

## 2018-07-21 DIAGNOSIS — I10 HYPERTENSION, ESSENTIAL: ICD-10-CM

## 2018-07-21 LAB
ALBUMIN SERPL-MCNC: 3.1 G/DL (ref 3.5–4.8)
ALBUMIN/GLOB SERPL: 0.8 {RATIO} (ref 1–2)
ALP LIVER SERPL-CCNC: 97 U/L (ref 55–142)
ALT SERPL-CCNC: 32 U/L (ref 14–54)
ANION GAP SERPL CALC-SCNC: 8 MMOL/L (ref 0–18)
AST SERPL-CCNC: 24 U/L (ref 15–41)
BILIRUB SERPL-MCNC: 1.2 MG/DL (ref 0.1–2)
BUN BLD-MCNC: 28 MG/DL (ref 8–20)
BUN/CREAT SERPL: 26.7 (ref 10–20)
CALCIUM BLD-MCNC: 8.9 MG/DL (ref 8.3–10.3)
CHLORIDE SERPL-SCNC: 108 MMOL/L (ref 101–111)
CO2 SERPL-SCNC: 27 MMOL/L (ref 22–32)
CREAT BLD-MCNC: 1.05 MG/DL (ref 0.55–1.02)
GLOBULIN PLAS-MCNC: 3.9 G/DL (ref 2.5–3.7)
GLUCOSE BLD-MCNC: 87 MG/DL (ref 70–99)
M PROTEIN MFR SERPL ELPH: 7 G/DL (ref 6.1–8.3)
OSMOLALITY SERPL CALC.SUM OF ELEC: 301 MOSM/KG (ref 275–295)
POTASSIUM SERPL-SCNC: 4.2 MMOL/L (ref 3.6–5.1)
SODIUM SERPL-SCNC: 143 MMOL/L (ref 136–144)
TSI SER-ACNC: 3.87 MIU/ML (ref 0.35–5.5)

## 2018-07-21 PROCEDURE — 80053 COMPREHEN METABOLIC PANEL: CPT

## 2018-07-21 PROCEDURE — 84443 ASSAY THYROID STIM HORMONE: CPT

## 2018-07-21 PROCEDURE — 36415 COLL VENOUS BLD VENIPUNCTURE: CPT

## 2018-09-21 ENCOUNTER — MYAURORA ACCOUNT LINK (OUTPATIENT)
Dept: OTHER | Age: 83
End: 2018-09-21

## 2018-09-21 ENCOUNTER — PRIOR ORIGINAL RECORDS (OUTPATIENT)
Dept: OTHER | Age: 83
End: 2018-09-21

## 2018-11-16 ENCOUNTER — PRIOR ORIGINAL RECORDS (OUTPATIENT)
Dept: OTHER | Age: 83
End: 2018-11-16

## 2018-11-16 ENCOUNTER — LAB ENCOUNTER (OUTPATIENT)
Dept: LAB | Facility: HOSPITAL | Age: 83
End: 2018-11-16
Attending: INTERNAL MEDICINE
Payer: MEDICARE

## 2018-11-16 DIAGNOSIS — E78.00 PURE HYPERCHOLESTEROLEMIA: Primary | ICD-10-CM

## 2018-11-16 DIAGNOSIS — I10 ESSENTIAL HYPERTENSION, MALIGNANT: ICD-10-CM

## 2018-11-16 PROCEDURE — 84450 TRANSFERASE (AST) (SGOT): CPT

## 2018-11-16 PROCEDURE — 84460 ALANINE AMINO (ALT) (SGPT): CPT

## 2018-11-16 PROCEDURE — 80061 LIPID PANEL: CPT

## 2018-11-16 PROCEDURE — 36415 COLL VENOUS BLD VENIPUNCTURE: CPT

## 2018-11-20 ENCOUNTER — PRIOR ORIGINAL RECORDS (OUTPATIENT)
Dept: OTHER | Age: 83
End: 2018-11-20

## 2018-11-23 LAB
ALT (SGPT): 43 U/L
AST (SGOT): 32 U/L
CHOLESTEROL, TOTAL: 137 MG/DL
HDL CHOLESTEROL: 48 MG/DL
LDL CHOLESTEROL: 91 MG/DL
NON-HDL CHOLESTEROL: 119 MG/DL
TRIGLYCERIDES: 139 MG/DL

## 2019-01-11 ENCOUNTER — APPOINTMENT (OUTPATIENT)
Dept: CT IMAGING | Facility: HOSPITAL | Age: 84
End: 2019-01-11
Attending: EMERGENCY MEDICINE
Payer: MEDICARE

## 2019-01-11 ENCOUNTER — PRIOR ORIGINAL RECORDS (OUTPATIENT)
Dept: OTHER | Age: 84
End: 2019-01-11

## 2019-01-11 ENCOUNTER — HOSPITAL ENCOUNTER (OUTPATIENT)
Facility: HOSPITAL | Age: 84
Setting detail: OBSERVATION
Discharge: HOME | End: 2019-01-12
Attending: EMERGENCY MEDICINE | Admitting: HOSPITALIST
Payer: MEDICARE

## 2019-01-11 ENCOUNTER — APPOINTMENT (OUTPATIENT)
Dept: GENERAL RADIOLOGY | Facility: HOSPITAL | Age: 84
End: 2019-01-11
Attending: EMERGENCY MEDICINE
Payer: MEDICARE

## 2019-01-11 DIAGNOSIS — R07.9 ACUTE CHEST PAIN: Primary | ICD-10-CM

## 2019-01-11 LAB
ALBUMIN SERPL-MCNC: 3.7 G/DL (ref 3.1–4.5)
ALBUMIN/GLOB SERPL: 0.9 {RATIO} (ref 1–2)
ALP LIVER SERPL-CCNC: 93 U/L (ref 55–142)
ALT SERPL-CCNC: 41 U/L (ref 14–54)
ANION GAP SERPL CALC-SCNC: 6 MMOL/L (ref 0–18)
AST SERPL-CCNC: 30 U/L (ref 15–41)
ATRIAL RATE: 81 BPM
BASOPHILS # BLD AUTO: 0.03 X10(3) UL (ref 0–0.1)
BASOPHILS NFR BLD AUTO: 0.3 %
BILIRUB SERPL-MCNC: 1.1 MG/DL (ref 0.1–2)
BUN BLD-MCNC: 30 MG/DL (ref 8–20)
BUN/CREAT SERPL: 26.8 (ref 10–20)
CALCIUM BLD-MCNC: 9.5 MG/DL (ref 8.3–10.3)
CHLORIDE SERPL-SCNC: 103 MMOL/L (ref 101–111)
CO2 SERPL-SCNC: 31 MMOL/L (ref 22–32)
CREAT BLD-MCNC: 1.12 MG/DL (ref 0.55–1.02)
D-DIMER: 1.25 UG/ML FEU (ref 0–0.49)
EOSINOPHIL # BLD AUTO: 0.23 X10(3) UL (ref 0–0.3)
EOSINOPHIL NFR BLD AUTO: 2.1 %
ERYTHROCYTE [DISTWIDTH] IN BLOOD BY AUTOMATED COUNT: 13.2 % (ref 11.5–16)
GLOBULIN PLAS-MCNC: 4.1 G/DL (ref 2.8–4.4)
GLUCOSE BLD-MCNC: 74 MG/DL (ref 70–99)
HCT VFR BLD AUTO: 47.7 % (ref 34–50)
HGB BLD-MCNC: 15.8 G/DL (ref 12–16)
IMMATURE GRANULOCYTE COUNT: 0.03 X10(3) UL (ref 0–1)
IMMATURE GRANULOCYTE RATIO %: 0.3 %
LYMPHOCYTES # BLD AUTO: 5.67 X10(3) UL (ref 0.9–4)
LYMPHOCYTES NFR BLD AUTO: 51.3 %
M PROTEIN MFR SERPL ELPH: 7.8 G/DL (ref 6.4–8.2)
MCH RBC QN AUTO: 31 PG (ref 27–33.2)
MCHC RBC AUTO-ENTMCNC: 33.1 G/DL (ref 31–37)
MCV RBC AUTO: 93.7 FL (ref 81–100)
MONOCYTES # BLD AUTO: 0.78 X10(3) UL (ref 0.1–1)
MONOCYTES NFR BLD AUTO: 7.1 %
NEUTROPHIL ABS PRELIM: 4.32 X10 (3) UL (ref 1.3–6.7)
NEUTROPHILS # BLD AUTO: 4.32 X10(3) UL (ref 1.3–6.7)
NEUTROPHILS NFR BLD AUTO: 38.9 %
OSMOLALITY SERPL CALC.SUM OF ELEC: 295 MOSM/KG (ref 275–295)
P AXIS: 41 DEGREES
P-R INTERVAL: 202 MS
PLATELET # BLD AUTO: 275 10(3)UL (ref 150–450)
POTASSIUM SERPL-SCNC: 3.7 MMOL/L (ref 3.6–5.1)
Q-T INTERVAL: 394 MS
QRS DURATION: 100 MS
QTC CALCULATION (BEZET): 457 MS
R AXIS: -55 DEGREES
RBC # BLD AUTO: 5.09 X10(6)UL (ref 3.8–5.1)
RED CELL DISTRIBUTION WIDTH-SD: 45.2 FL (ref 35.1–46.3)
SODIUM SERPL-SCNC: 140 MMOL/L (ref 136–144)
T AXIS: 75 DEGREES
TROPONIN I SERPL-MCNC: <0.046 NG/ML (ref ?–0.05)
TROPONIN I SERPL-MCNC: <0.046 NG/ML (ref ?–0.05)
VENTRICULAR RATE: 81 BPM
WBC # BLD AUTO: 11.1 X10(3) UL (ref 4–13)

## 2019-01-11 PROCEDURE — 99219 INITIAL OBSERVATION CARE,LEVL II: CPT | Performed by: STUDENT IN AN ORGANIZED HEALTH CARE EDUCATION/TRAINING PROGRAM

## 2019-01-11 PROCEDURE — 71275 CT ANGIOGRAPHY CHEST: CPT | Performed by: EMERGENCY MEDICINE

## 2019-01-11 PROCEDURE — 71045 X-RAY EXAM CHEST 1 VIEW: CPT | Performed by: EMERGENCY MEDICINE

## 2019-01-11 RX ORDER — ACETAMINOPHEN 325 MG/1
650 TABLET ORAL EVERY 6 HOURS PRN
Status: DISCONTINUED | OUTPATIENT
Start: 2019-01-11 | End: 2019-01-12

## 2019-01-11 RX ORDER — DONEPEZIL HYDROCHLORIDE 10 MG/1
10 TABLET, FILM COATED ORAL NIGHTLY
Status: DISCONTINUED | OUTPATIENT
Start: 2019-01-11 | End: 2019-01-12

## 2019-01-11 RX ORDER — MULTIVITAMIN WITH FOLIC ACID 400 MCG
1 TABLET ORAL DAILY
COMMUNITY

## 2019-01-11 RX ORDER — BISACODYL 10 MG
10 SUPPOSITORY, RECTAL RECTAL
Status: DISCONTINUED | OUTPATIENT
Start: 2019-01-11 | End: 2019-01-12

## 2019-01-11 RX ORDER — ONDANSETRON 2 MG/ML
4 INJECTION INTRAMUSCULAR; INTRAVENOUS EVERY 6 HOURS PRN
Status: DISCONTINUED | OUTPATIENT
Start: 2019-01-11 | End: 2019-01-12

## 2019-01-11 RX ORDER — ASPIRIN 325 MG
325 TABLET ORAL DAILY
Status: DISCONTINUED | OUTPATIENT
Start: 2019-01-12 | End: 2019-01-12

## 2019-01-11 RX ORDER — MAGNESIUM OXIDE 400 MG (241.3 MG MAGNESIUM) TABLET
400 TABLET DAILY
Status: DISCONTINUED | OUTPATIENT
Start: 2019-01-12 | End: 2019-01-12

## 2019-01-11 RX ORDER — ASPIRIN 81 MG/1
324 TABLET, CHEWABLE ORAL ONCE
Status: COMPLETED | OUTPATIENT
Start: 2019-01-11 | End: 2019-01-11

## 2019-01-11 RX ORDER — METOCLOPRAMIDE HYDROCHLORIDE 5 MG/ML
5 INJECTION INTRAMUSCULAR; INTRAVENOUS EVERY 8 HOURS PRN
Status: DISCONTINUED | OUTPATIENT
Start: 2019-01-11 | End: 2019-01-12

## 2019-01-11 RX ORDER — FLUOROMETHOLONE 0.1 %
1 SUSPENSION, DROPS(FINAL DOSAGE FORM)(ML) OPHTHALMIC (EYE) 2 TIMES DAILY
Status: DISCONTINUED | OUTPATIENT
Start: 2019-01-11 | End: 2019-01-12

## 2019-01-11 RX ORDER — METOPROLOL SUCCINATE 50 MG/1
50 TABLET, EXTENDED RELEASE ORAL
Status: DISCONTINUED | OUTPATIENT
Start: 2019-01-12 | End: 2019-01-12

## 2019-01-11 RX ORDER — ENOXAPARIN SODIUM 100 MG/ML
40 INJECTION SUBCUTANEOUS DAILY
Status: DISCONTINUED | OUTPATIENT
Start: 2019-01-12 | End: 2019-01-12

## 2019-01-11 RX ORDER — POLYETHYLENE GLYCOL 3350 17 G/17G
17 POWDER, FOR SOLUTION ORAL DAILY PRN
Status: DISCONTINUED | OUTPATIENT
Start: 2019-01-11 | End: 2019-01-12

## 2019-01-11 RX ORDER — NITROGLYCERIN 0.4 MG/1
0.4 TABLET SUBLINGUAL EVERY 5 MIN PRN
Status: DISCONTINUED | OUTPATIENT
Start: 2019-01-11 | End: 2019-01-12

## 2019-01-11 RX ORDER — DOCUSATE SODIUM 100 MG/1
100 CAPSULE, LIQUID FILLED ORAL AS NEEDED
COMMUNITY

## 2019-01-11 RX ORDER — SODIUM PHOSPHATE, DIBASIC AND SODIUM PHOSPHATE, MONOBASIC 7; 19 G/133ML; G/133ML
1 ENEMA RECTAL ONCE AS NEEDED
Status: DISCONTINUED | OUTPATIENT
Start: 2019-01-11 | End: 2019-01-12

## 2019-01-11 NOTE — ED PROVIDER NOTES
Patient Seen in: [facility] Emergency Department    History   Patient presents with:  Chest Pain Angina (cardiovascular)    Stated Complaint: chest pain    HPI    PCP: Dr. Charlene Fernandez  Cardiologist: Dr. Josh Arzola    This is an 45-year-old female with past medica TONSILLECTOMY             Social History    Tobacco Use      Smoking status: Never Smoker      Smokeless tobacco: Never Used    Alcohol use: No      Alcohol/week: 0.0 oz    Drug use: No      Review of Systems    Positive for stated complaint: chest pain  O with a negative predictive value of approximately 95% when results are used as part of the total clinical evaluation of the patient.    CBC W/ DIFFERENTIAL - Abnormal; Notable for the following components:    Lymphocyte Absolute 5.67 (*)     All other compo echo 2/2017 EF 65-70%. Admission disposition: 1/11/2019  7:59 PM       Patient will be admitted for further cardiac evaluation. Plan for admission discussed with patient and her family. Expressed understanding.   [facility]

## 2019-01-11 NOTE — ED NOTES
Karl Chen RN notified that pt needs to stay in ED until she has her CT scan. Pt and her granddaughters notified.

## 2019-01-11 NOTE — ED INITIAL ASSESSMENT (HPI)
Patient complaining of chest pain across the chest since she woke up this morning at 9 am. Reports nausea. Denies dyspnea. Denies cough or fevers. Reports she was at [place] 3 weeks ago.

## 2019-01-12 ENCOUNTER — APPOINTMENT (OUTPATIENT)
Dept: CV DIAGNOSTICS | Facility: HOSPITAL | Age: 84
End: 2019-01-12
Attending: INTERNAL MEDICINE
Payer: MEDICARE

## 2019-01-12 VITALS
BODY MASS INDEX: 25.61 KG/M2 | RESPIRATION RATE: 20 BRPM | SYSTOLIC BLOOD PRESSURE: 154 MMHG | DIASTOLIC BLOOD PRESSURE: 67 MMHG | WEIGHT: 150 LBS | HEART RATE: 63 BPM | HEIGHT: 64 IN | OXYGEN SATURATION: 98 % | TEMPERATURE: 98 F

## 2019-01-12 LAB
ANION GAP SERPL CALC-SCNC: 6 MMOL/L (ref 0–18)
BASOPHILS # BLD AUTO: 0.03 X10(3) UL (ref 0–0.1)
BASOPHILS NFR BLD AUTO: 0.4 %
BUN BLD-MCNC: 29 MG/DL (ref 8–20)
BUN/CREAT SERPL: 32.2 (ref 10–20)
CALCIUM BLD-MCNC: 8.8 MG/DL (ref 8.3–10.3)
CHLORIDE SERPL-SCNC: 105 MMOL/L (ref 101–111)
CHOLEST SMN-MCNC: 142 MG/DL (ref ?–200)
CO2 SERPL-SCNC: 29 MMOL/L (ref 22–32)
CREAT BLD-MCNC: 0.9 MG/DL (ref 0.55–1.02)
EOSINOPHIL # BLD AUTO: 0.19 X10(3) UL (ref 0–0.3)
EOSINOPHIL NFR BLD AUTO: 2.7 %
ERYTHROCYTE [DISTWIDTH] IN BLOOD BY AUTOMATED COUNT: 13.2 % (ref 11.5–16)
GLUCOSE BLD-MCNC: 95 MG/DL (ref 70–99)
HCT VFR BLD AUTO: 39.6 % (ref 34–50)
HDLC SERPL-MCNC: 42 MG/DL (ref 40–59)
HGB BLD-MCNC: 12.9 G/DL (ref 12–16)
IMMATURE GRANULOCYTE COUNT: 0.01 X10(3) UL (ref 0–1)
IMMATURE GRANULOCYTE RATIO %: 0.1 %
LDLC SERPL CALC-MCNC: 84 MG/DL (ref ?–100)
LYMPHOCYTES # BLD AUTO: 2.74 X10(3) UL (ref 0.9–4)
LYMPHOCYTES NFR BLD AUTO: 39.1 %
MCH RBC QN AUTO: 30.4 PG (ref 27–33.2)
MCHC RBC AUTO-ENTMCNC: 32.6 G/DL (ref 31–37)
MCV RBC AUTO: 93.2 FL (ref 81–100)
MONOCYTES # BLD AUTO: 0.64 X10(3) UL (ref 0.1–1)
MONOCYTES NFR BLD AUTO: 9.1 %
NEUTROPHIL ABS PRELIM: 3.39 X10 (3) UL (ref 1.3–6.7)
NEUTROPHILS # BLD AUTO: 3.39 X10(3) UL (ref 1.3–6.7)
NEUTROPHILS NFR BLD AUTO: 48.6 %
NONHDLC SERPL-MCNC: 100 MG/DL (ref ?–130)
OSMOLALITY SERPL CALC.SUM OF ELEC: 296 MOSM/KG (ref 275–295)
PLATELET # BLD AUTO: 228 10(3)UL (ref 150–450)
POTASSIUM SERPL-SCNC: 4.1 MMOL/L (ref 3.6–5.1)
RBC # BLD AUTO: 4.25 X10(6)UL (ref 3.8–5.1)
RED CELL DISTRIBUTION WIDTH-SD: 44.8 FL (ref 35.1–46.3)
SODIUM SERPL-SCNC: 140 MMOL/L (ref 136–144)
TRIGL SERPL-MCNC: 80 MG/DL (ref 30–149)
TROPONIN I SERPL-MCNC: <0.046 NG/ML (ref ?–0.05)
VLDLC SERPL CALC-MCNC: 16 MG/DL (ref 0–30)
WBC # BLD AUTO: 7 X10(3) UL (ref 4–13)

## 2019-01-12 PROCEDURE — 99217 OBSERVATION CARE DISCHARGE: CPT | Performed by: HOSPITALIST

## 2019-01-12 RX ORDER — PANTOPRAZOLE SODIUM 40 MG/1
40 TABLET, DELAYED RELEASE ORAL
Qty: 30 TABLET | Refills: 0 | Status: SHIPPED | OUTPATIENT
Start: 2019-01-12 | End: 2019-08-27 | Stop reason: ALTCHOICE

## 2019-01-12 NOTE — PROGRESS NOTES
[facility] PS[facility] [place] HOSPITALIST  Progress Note     Yolande Birmingham Patient Status:  Observation    1932 MRN [id number]   [place] 2NE-A Attending Yung Barry MD   Hosp Day # 0 PCP Elza Madsen MD     Chief Complaint: Chest pain    S: Patie Blocker   • aspirin  325 mg Oral Daily   • enoxaparin  40 mg Subcutaneous Daily       ASSESSMENT / PLAN:     1. Chest pain, troponin normal, 2D echo pending, resolved, cardiology following and favor conservative approach  2.  Nausea, ?unclear etiology, star

## 2019-01-12 NOTE — PLAN OF CARE
Patient/Family Goals    • Patient/Family Long Term Goal Progressing    • Patient/Family Short Term Goal Progressing            Received patient this am aaox3- forgetful with details- baseline, family at bedside, [address]- baseline- hearing aid in place  Apaced

## 2019-01-12 NOTE — HISTORICAL OFFICE NOTE
Julio Simms  : 1932  ACCOUNT:  [id number]  [phone]  PCP:    TODAY'S DATE: 2018  DICTATED BY:  [Dr. Duglas Bar: [Followup of Bradycardia, Followup of Pacemaker, not dependent and Followup of Supraventricular tachyca compatible 4/2017    FAMILY HISTORY: Negative for premature CAD. Negative for AAA. SOCIAL HISTORY: SMOKING: Never used tobacco. denies smoking. CAFFEINE: 3 beverages daily. ALCOHOL: denies drinking. EXERCISE: no regular exercise. DIET: no special diet.  MA Continue the metoprolol at the current dose. Her blood pressure is very good today. If she has further episodes of SVT or nonsustained VT may consider increasing the beta-blocker further.   3.  Continue follow-up with the pacer clinic in 1 year and I will

## 2019-01-12 NOTE — PROGRESS NOTES
[facility] Pharmacy Note:  Renal Dose Adjustment for Metoclopramide (REGLAN)    Zoie Escalona has been prescribed Metoclopramide (REGLAN) 10 mg every 8 hours as needed for n/v.    Estimated Creatinine Clearance: 31.1 mL/min (A) (based on SCr of 1.12 mg/dL (

## 2019-01-12 NOTE — PLAN OF CARE
Patient/Family Goals    • Patient/Family Long Term Goal Progressing    • Patient/Family Short Term Goal Progressing          Admitted from ED  Granddaughters at bedside and assisted with admission navigator  Patient reports she is a DNR- granddaughters agr

## 2019-01-12 NOTE — CONSULTS
[facility]  Report of Consultation    Jean Median Patient Status:  Emergency    1932 MRN [id number]   Location [address] Attending Alejandrina Grove, [address] [facility] Day # 0 PCP Manuel Rader MD     Reason for [facility] Ovarian Cancer   • Cancer Sister         Kidney cancer   • Dementia Sister    • Dementia Brother    • Heart Disorder Brother       reports that  has never smoked.  she has never used smokeless tobacco. She reports that she does not drink alcohol or use drug disturbance     Acute chest pain      1. Chest pain   - Reassuring normal troponin with continuous symptoms all day   - Repeat troponin in am   - Monitor on telemetry   - Echo in am   - Interrogate PPM in am    2.  Hx PPM   - Interrogate   - Functioning nor

## 2019-01-12 NOTE — ICD/PM
Cardiology addendum    Medtronic remote interrogation reviewed by Brent - normal function no stored events.     Routine outpatient follow up    [address] [facility],

## 2019-01-12 NOTE — H&P
[facility]  History and Physical     Xenia Bearded Patient Status:  Observation    1932 MRN [id number]   [facility] 2NE-A Attending Matthew Michele MD   Hosp Day # 0 PCP An Altamirano MD     Chief Complaint: Chest pain Brother        Allergies: No Known Allergies    Medications:    No current facility-administered medications on file prior to encounter.    Current Outpatient Medications on File Prior to Encounter:  Multiple Vitamin (TAB-A-SHAILESH) Oral Tab Take 1 tablet by m Recent Labs   Lab  01/11/19   1527   GLU  74   BUN  30*   CREATSERUM  1.12*   GFRAA  51*   GFRNAA  45*   CA  9.5   ALB  3.7   NA  140   K  3.7   CL  103   CO2  31.0   ALKPHO  93   AST  30   ALT  41   BILT  1.1   TP  7.8       Estimated Creatinine Karsten

## 2019-01-12 NOTE — PROGRESS NOTES
Pacemaker device interrogated at bedside,Medtronic rep called  Report in the chart  Florence IRENE notified

## 2019-01-12 NOTE — PROGRESS NOTES
· [facility]      Subjective:  Up in chair no pain and doesn't remember chest pain.   Rick Tubbsa says she described pain as bandlike sensation across chest    Objective:  149/51  A pace v sense  Afebrile    troponins normal  BUN/cr 29/0.90  Hgb 1

## 2019-01-13 NOTE — DISCHARGE SUMMARY
[facility] HOSPITALIST  DISCHARGE SUMMARY     Avqian Mustafa Patient Status:  Observation    1932 MRN [id number]   [place] 2NE-A Attending No att. providers found   Hosp Day # 0 PCP Felipe Staley MD     Date of Admission: 2019 daily.   Quantity:  30 tablet  Refills:  11     Pantoprazole Sodium 40 MG Tbec  Commonly known as:  PROTONIX      Take 1 tablet (40 mg total) by mouth daily as needed (gerd).    Quantity:  30 tablet  Refills:  0        CONTINUE taking these medications Exam:    General: No acute distress. Respiratory: Clear to auscultation bilaterally. No wheezes. No rhonchi. Cardiovascular: S1, S2. Regular rate and rhythm. No murmurs, rubs or gallops. Abdomen: Soft, nontender, nondistended. Positive bowel sounds.

## 2019-01-13 NOTE — PLAN OF CARE
Care assumed of patient at 200  Dr. Sinan Mishra paged for discharge orders/med rec  Per day RN Dr. Rashida Sutton discharged patient but orders not entered  Discharge AVS reviewed with patient and grandson  Script given to  Grandson  IV and tele removed  Wheelchair

## 2019-02-28 VITALS — SYSTOLIC BLOOD PRESSURE: 112 MMHG | HEART RATE: 65 BPM | WEIGHT: 126 LBS | DIASTOLIC BLOOD PRESSURE: 58 MMHG

## 2019-02-28 VITALS
BODY MASS INDEX: 20.2 KG/M2 | DIASTOLIC BLOOD PRESSURE: 56 MMHG | HEART RATE: 60 BPM | SYSTOLIC BLOOD PRESSURE: 118 MMHG | HEIGHT: 63 IN | WEIGHT: 114 LBS

## 2019-03-01 VITALS
DIASTOLIC BLOOD PRESSURE: 62 MMHG | SYSTOLIC BLOOD PRESSURE: 162 MMHG | HEIGHT: 63 IN | HEART RATE: 66 BPM | WEIGHT: 117 LBS | BODY MASS INDEX: 20.73 KG/M2

## 2019-03-01 VITALS
HEIGHT: 63 IN | DIASTOLIC BLOOD PRESSURE: 80 MMHG | WEIGHT: 116 LBS | BODY MASS INDEX: 20.55 KG/M2 | SYSTOLIC BLOOD PRESSURE: 180 MMHG

## 2019-03-01 VITALS — WEIGHT: 112 LBS | DIASTOLIC BLOOD PRESSURE: 78 MMHG | SYSTOLIC BLOOD PRESSURE: 142 MMHG | HEART RATE: 60 BPM

## 2019-04-15 RX ORDER — ATORVASTATIN CALCIUM 10 MG/1
TABLET, FILM COATED ORAL
COMMUNITY
Start: 2018-11-23 | End: 2019-12-12 | Stop reason: SDUPTHER

## 2019-04-15 RX ORDER — MAGNESIUM OXIDE 400 MG/1
TABLET ORAL
COMMUNITY

## 2019-04-15 RX ORDER — METOPROLOL SUCCINATE 50 MG/1
TABLET, EXTENDED RELEASE ORAL
COMMUNITY
Start: 2018-09-21 | End: 2019-09-07 | Stop reason: SDUPTHER

## 2019-04-15 RX ORDER — DIPHENOXYLATE HYDROCHLORIDE AND ATROPINE SULFATE 2.5; .025 MG/1; MG/1
TABLET ORAL
COMMUNITY

## 2019-04-15 RX ORDER — ASPIRIN 81 MG
TABLET, DELAYED RELEASE (ENTERIC COATED) ORAL
COMMUNITY

## 2019-05-22 ENCOUNTER — APPOINTMENT (OUTPATIENT)
Dept: CARDIOLOGY | Age: 84
End: 2019-05-22
Attending: INTERNAL MEDICINE

## 2019-06-19 ENCOUNTER — ANCILLARY ORDERS (OUTPATIENT)
Dept: CARDIOLOGY | Age: 84
End: 2019-06-19

## 2019-06-19 ENCOUNTER — ANCILLARY PROCEDURE (OUTPATIENT)
Dept: CARDIOLOGY | Age: 84
End: 2019-06-19
Attending: INTERNAL MEDICINE

## 2019-06-19 DIAGNOSIS — Z95.0 CARDIAC PACEMAKER: ICD-10-CM

## 2019-06-19 PROCEDURE — 93294 REM INTERROG EVL PM/LDLS PM: CPT | Performed by: INTERNAL MEDICINE

## 2019-06-20 ENCOUNTER — TELEPHONE (OUTPATIENT)
Dept: CARDIOLOGY | Age: 84
End: 2019-06-20

## 2019-08-20 ENCOUNTER — LAB ENCOUNTER (OUTPATIENT)
Dept: LAB | Age: 84
End: 2019-08-20
Attending: INTERNAL MEDICINE
Payer: MEDICARE

## 2019-08-20 ENCOUNTER — TELEPHONE (OUTPATIENT)
Dept: INTERNAL MEDICINE CLINIC | Facility: CLINIC | Age: 84
End: 2019-08-20

## 2019-08-20 DIAGNOSIS — I10 HYPERTENSION, ESSENTIAL: ICD-10-CM

## 2019-08-20 DIAGNOSIS — E78.2 MIXED HYPERLIPIDEMIA: ICD-10-CM

## 2019-08-20 DIAGNOSIS — R53.83 FATIGUE, UNSPECIFIED TYPE: ICD-10-CM

## 2019-08-20 DIAGNOSIS — N30.00 ACUTE CYSTITIS WITHOUT HEMATURIA: Primary | ICD-10-CM

## 2019-08-20 LAB
ALBUMIN SERPL-MCNC: 3.4 G/DL (ref 3.4–5)
ALBUMIN/GLOB SERPL: 1 {RATIO} (ref 1–2)
ALP LIVER SERPL-CCNC: 84 U/L (ref 55–142)
ALT SERPL-CCNC: 31 U/L (ref 13–56)
ANION GAP SERPL CALC-SCNC: 4 MMOL/L (ref 0–18)
AST SERPL-CCNC: 21 U/L (ref 15–37)
BASOPHILS # BLD AUTO: 0.05 X10(3) UL (ref 0–0.2)
BASOPHILS NFR BLD AUTO: 0.6 %
BILIRUB SERPL-MCNC: 1.2 MG/DL (ref 0.1–2)
BILIRUB UR QL STRIP.AUTO: NEGATIVE
BUN BLD-MCNC: 24 MG/DL (ref 7–18)
BUN/CREAT SERPL: 21.6 (ref 10–20)
CALCIUM BLD-MCNC: 9 MG/DL (ref 8.5–10.1)
CHLORIDE SERPL-SCNC: 109 MMOL/L (ref 98–112)
CHOLEST SMN-MCNC: 143 MG/DL (ref ?–200)
CO2 SERPL-SCNC: 29 MMOL/L (ref 21–32)
CREAT BLD-MCNC: 1.11 MG/DL (ref 0.55–1.02)
DEPRECATED RDW RBC AUTO: 49 FL (ref 35.1–46.3)
EOSINOPHIL # BLD AUTO: 0.31 X10(3) UL (ref 0–0.7)
EOSINOPHIL NFR BLD AUTO: 4 %
ERYTHROCYTE [DISTWIDTH] IN BLOOD BY AUTOMATED COUNT: 14.1 % (ref 11–15)
GLOBULIN PLAS-MCNC: 3.5 G/DL (ref 2.8–4.4)
GLUCOSE BLD-MCNC: 90 MG/DL (ref 70–99)
GLUCOSE UR STRIP.AUTO-MCNC: NEGATIVE MG/DL
HCT VFR BLD AUTO: 45.3 % (ref 35–48)
HDLC SERPL-MCNC: 43 MG/DL (ref 40–59)
HGB BLD-MCNC: 14.6 G/DL (ref 12–16)
HYALINE CASTS #/AREA URNS AUTO: PRESENT /LPF
IMM GRANULOCYTES # BLD AUTO: 0.03 X10(3) UL (ref 0–1)
IMM GRANULOCYTES NFR BLD: 0.4 %
KETONES UR STRIP.AUTO-MCNC: NEGATIVE MG/DL
LDLC SERPL CALC-MCNC: 74 MG/DL (ref ?–100)
LYMPHOCYTES # BLD AUTO: 3.18 X10(3) UL (ref 1–4)
LYMPHOCYTES NFR BLD AUTO: 41.3 %
M PROTEIN MFR SERPL ELPH: 6.9 G/DL (ref 6.4–8.2)
MCH RBC QN AUTO: 30.3 PG (ref 26–34)
MCHC RBC AUTO-ENTMCNC: 32.2 G/DL (ref 31–37)
MCV RBC AUTO: 94 FL (ref 80–100)
MONOCYTES # BLD AUTO: 0.56 X10(3) UL (ref 0.1–1)
MONOCYTES NFR BLD AUTO: 7.3 %
NEUTROPHILS # BLD AUTO: 3.57 X10 (3) UL (ref 1.5–7.7)
NEUTROPHILS # BLD AUTO: 3.57 X10(3) UL (ref 1.5–7.7)
NEUTROPHILS NFR BLD AUTO: 46.4 %
NITRITE UR QL STRIP.AUTO: POSITIVE
NONHDLC SERPL-MCNC: 100 MG/DL (ref ?–130)
OSMOLALITY SERPL CALC.SUM OF ELEC: 298 MOSM/KG (ref 275–295)
PH UR STRIP.AUTO: 5 [PH] (ref 4.5–8)
PLATELET # BLD AUTO: 253 10(3)UL (ref 150–450)
POTASSIUM SERPL-SCNC: 4.5 MMOL/L (ref 3.5–5.1)
PROT UR STRIP.AUTO-MCNC: NEGATIVE MG/DL
RBC # BLD AUTO: 4.82 X10(6)UL (ref 3.8–5.3)
RBC #/AREA URNS AUTO: >10 /HPF
RBC UR QL AUTO: NEGATIVE
SODIUM SERPL-SCNC: 142 MMOL/L (ref 136–145)
SP GR UR STRIP.AUTO: 1.02 (ref 1–1.03)
TRIGL SERPL-MCNC: 131 MG/DL (ref 30–149)
TSI SER-ACNC: 3.66 MIU/ML (ref 0.36–3.74)
UROBILINOGEN UR STRIP.AUTO-MCNC: <2 MG/DL
VLDLC SERPL CALC-MCNC: 26 MG/DL (ref 0–30)
WBC # BLD AUTO: 7.7 X10(3) UL (ref 4–11)
WBC #/AREA URNS AUTO: >50 /HPF
WBC CLUMPS UR QL AUTO: PRESENT

## 2019-08-20 PROCEDURE — 80053 COMPREHEN METABOLIC PANEL: CPT

## 2019-08-20 PROCEDURE — 81001 URINALYSIS AUTO W/SCOPE: CPT

## 2019-08-20 PROCEDURE — 80061 LIPID PANEL: CPT

## 2019-08-20 PROCEDURE — 85025 COMPLETE CBC W/AUTO DIFF WBC: CPT

## 2019-08-20 PROCEDURE — 36415 COLL VENOUS BLD VENIPUNCTURE: CPT

## 2019-08-20 PROCEDURE — 84443 ASSAY THYROID STIM HORMONE: CPT

## 2019-08-20 RX ORDER — CEPHALEXIN 500 MG/1
500 CAPSULE ORAL 3 TIMES DAILY
Qty: 21 CAPSULE | Refills: 0 | Status: SHIPPED | OUTPATIENT
Start: 2019-08-20 | End: 2019-08-27 | Stop reason: ALTCHOICE

## 2019-08-20 NOTE — TELEPHONE ENCOUNTER
----- Message from Trisha Macias MD sent at 8/20/2019 10:36 AM CDT -----  UA =cystitis.  Treat with keflex 500mg tid x 7 days

## 2019-08-27 ENCOUNTER — OFFICE VISIT (OUTPATIENT)
Dept: INTERNAL MEDICINE CLINIC | Facility: CLINIC | Age: 84
End: 2019-08-27
Payer: MEDICARE

## 2019-08-27 VITALS
TEMPERATURE: 98 F | HEART RATE: 78 BPM | DIASTOLIC BLOOD PRESSURE: 80 MMHG | WEIGHT: 142 LBS | SYSTOLIC BLOOD PRESSURE: 124 MMHG | RESPIRATION RATE: 16 BRPM | HEIGHT: 62 IN | BODY MASS INDEX: 26.13 KG/M2

## 2019-08-27 DIAGNOSIS — I49.5 TACHY-BRADY SYNDROME (HCC): ICD-10-CM

## 2019-08-27 DIAGNOSIS — Z23 NEED FOR PNEUMOCOCCAL VACCINATION: ICD-10-CM

## 2019-08-27 DIAGNOSIS — F01.50 VASCULAR DEMENTIA WITHOUT BEHAVIORAL DISTURBANCE (HCC): ICD-10-CM

## 2019-08-27 DIAGNOSIS — E78.2 MIXED HYPERLIPIDEMIA: ICD-10-CM

## 2019-08-27 DIAGNOSIS — Z00.00 ANNUAL PHYSICAL EXAM: Primary | ICD-10-CM

## 2019-08-27 DIAGNOSIS — I10 HYPERTENSION, ESSENTIAL: ICD-10-CM

## 2019-08-27 DIAGNOSIS — Z91.81 AT RISK FOR FALLING: ICD-10-CM

## 2019-08-27 DIAGNOSIS — Z00.00 ENCOUNTER FOR ANNUAL HEALTH EXAMINATION: ICD-10-CM

## 2019-08-27 PROBLEM — R07.9 ACUTE CHEST PAIN: Status: RESOLVED | Noted: 2019-01-11 | Resolved: 2019-08-27

## 2019-08-27 PROCEDURE — 90670 PCV13 VACCINE IM: CPT | Performed by: INTERNAL MEDICINE

## 2019-08-27 PROCEDURE — G0009 ADMIN PNEUMOCOCCAL VACCINE: HCPCS | Performed by: INTERNAL MEDICINE

## 2019-08-27 PROCEDURE — G0439 PPPS, SUBSEQ VISIT: HCPCS | Performed by: INTERNAL MEDICINE

## 2019-08-27 NOTE — PATIENT INSTRUCTIONS
Zoie Goldstein's SCREENING SCHEDULE   Tests on this list are recommended by your physician but may not be covered, or covered at this frequency, by your insurer. Please check with your insurance carrier before scheduling to verify coverage.    PREVEN often if abnormal There are no preventive care reminders to display for this patient. Update Health Maintenance if applicable    Flex Sigmoidoscopy Screen  Covered every 5 years No results found for this or any previous visit. No flowsheet data found. visit.  Please get once after your 65th birthday    Pneumococcal 23 (Pneumovax)  Covered Once after 65 Orders placed or performed in visit on 05/25/18   • PNEUMOCOCCAL IMM (PNEUMOVAX)    Please get once after your 65th birthday    Hepatitis B for Moderate/H

## 2019-08-27 NOTE — PROGRESS NOTES
HPI:   Nick Guerrero is a 80year old female who presents for a Medicare Subsequent Annual Wellness visit (Pt already had Initial Annual Wellness). HPI:  Here for AWV  Normal state of health  Did not tolerate aricept for dementia.  Related sxs are patient in AVS         She has never smoked tobacco.    CAGE Alcohol screening   Xenia Arriaga was screened for Alcohol abuse and had a score of 0 so is at low risk.     Patient Care Team: Patient Care Team:  Roland Lake MD as PCP - General (Intern Ophthalmic Suspension Place 1 drop into both eyes 2 (two) times daily.       MEDICAL INFORMATION:   She  has a past medical history of Arrhythmia, Arthritis (7/1996), Depression (6/2001), Essential hypertension, Hearing impairment, Hyperlipidemia, Eulene Lesches Screening    Screening Method:  Finger Rub  Finger Rub Result:  Pass            Visual Acuity  Right Eye Visual Acuity: Corrected Right Eye Chart Acuity: 20/20   Left Eye Visual Acuity: Corrected Left Eye Chart Acuity: 20/20   Both Eyes Visual Acuity: 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  Update [place] if applica with metal- TD and TDaP Not covered by Medicare Part B No vaccine history found This may be covered with your prescription benefits, but Medicare does not cover unless Medically needed    Zoster  Not covered by Medicare Part B No vaccine history found This

## 2019-09-09 RX ORDER — METOPROLOL SUCCINATE 50 MG/1
TABLET, EXTENDED RELEASE ORAL
Qty: 90 TABLET | Refills: 0 | Status: SHIPPED | OUTPATIENT
Start: 2019-09-09 | End: 2019-12-11 | Stop reason: SDUPTHER

## 2019-09-27 ENCOUNTER — OFFICE VISIT (OUTPATIENT)
Dept: CARDIOLOGY | Age: 84
End: 2019-09-27

## 2019-09-27 ENCOUNTER — ANCILLARY PROCEDURE (OUTPATIENT)
Dept: CARDIOLOGY | Age: 84
End: 2019-09-27
Attending: INTERNAL MEDICINE

## 2019-09-27 VITALS
WEIGHT: 141 LBS | BODY MASS INDEX: 24.98 KG/M2 | SYSTOLIC BLOOD PRESSURE: 136 MMHG | DIASTOLIC BLOOD PRESSURE: 68 MMHG | HEART RATE: 68 BPM | HEIGHT: 63 IN

## 2019-09-27 DIAGNOSIS — Z45.018 PACEMAKER REPROGRAMMING/CHECK: ICD-10-CM

## 2019-09-27 DIAGNOSIS — Z95.0 PACEMAKER: Primary | ICD-10-CM

## 2019-09-27 PROCEDURE — 99215 OFFICE O/P EST HI 40 MIN: CPT | Performed by: INTERNAL MEDICINE

## 2019-09-27 PROCEDURE — 93280 PM DEVICE PROGR EVAL DUAL: CPT | Performed by: INTERNAL MEDICINE

## 2019-09-27 ASSESSMENT — ENCOUNTER SYMPTOMS
HEMOPTYSIS: 0
BRUISES/BLEEDS EASILY: 0
COUGH: 0
ALLERGIC/IMMUNOLOGIC COMMENTS: NO NEW FOOD ALLERGIES
CHILLS: 0
WEIGHT GAIN: 0
FEVER: 0
WEIGHT LOSS: 0
HEMATOCHEZIA: 0
SUSPICIOUS LESIONS: 0

## 2019-09-28 ENCOUNTER — MED REC SCAN ONLY (OUTPATIENT)
Dept: INTERNAL MEDICINE CLINIC | Facility: CLINIC | Age: 84
End: 2019-09-28

## 2019-09-30 PROCEDURE — 93280 PM DEVICE PROGR EVAL DUAL: CPT | Performed by: INTERNAL MEDICINE

## 2019-12-05 ENCOUNTER — ANCILLARY PROCEDURE (OUTPATIENT)
Dept: CARDIOLOGY | Age: 84
End: 2019-12-05
Attending: INTERNAL MEDICINE

## 2019-12-05 ENCOUNTER — TELEPHONE (OUTPATIENT)
Dept: CARDIOLOGY | Age: 84
End: 2019-12-05

## 2019-12-05 ENCOUNTER — ANCILLARY ORDERS (OUTPATIENT)
Dept: CARDIOLOGY | Age: 84
End: 2019-12-05

## 2019-12-05 DIAGNOSIS — Z95.0 PACEMAKER: ICD-10-CM

## 2019-12-05 PROCEDURE — X1114 CARDIAC DEVICE HOME CHECK - REMOTE UNSCHEDULED: HCPCS | Performed by: INTERNAL MEDICINE

## 2019-12-11 ENCOUNTER — TELEPHONE (OUTPATIENT)
Dept: CARDIOLOGY | Age: 84
End: 2019-12-11

## 2019-12-11 RX ORDER — METOPROLOL SUCCINATE 50 MG/1
TABLET, EXTENDED RELEASE ORAL
Qty: 90 TABLET | Refills: 3 | Status: SHIPPED | OUTPATIENT
Start: 2019-12-11 | End: 2020-12-10 | Stop reason: SDUPTHER

## 2019-12-13 RX ORDER — ATORVASTATIN CALCIUM 10 MG/1
10 TABLET, FILM COATED ORAL AT BEDTIME
Qty: 90 TABLET | Refills: 3 | Status: SHIPPED | OUTPATIENT
Start: 2019-12-13 | End: 2020-12-10 | Stop reason: SDUPTHER

## 2019-12-23 ENCOUNTER — OFFICE VISIT (OUTPATIENT)
Dept: FAMILY MEDICINE CLINIC | Facility: CLINIC | Age: 84
End: 2019-12-23
Payer: MEDICARE

## 2019-12-23 VITALS
HEART RATE: 80 BPM | RESPIRATION RATE: 22 BRPM | SYSTOLIC BLOOD PRESSURE: 134 MMHG | BODY MASS INDEX: 25.4 KG/M2 | OXYGEN SATURATION: 98 % | TEMPERATURE: 99 F | HEIGHT: 62 IN | DIASTOLIC BLOOD PRESSURE: 90 MMHG | WEIGHT: 138 LBS

## 2019-12-23 DIAGNOSIS — L03.031 CELLULITIS OF FOURTH TOE OF RIGHT FOOT: Primary | ICD-10-CM

## 2019-12-23 PROCEDURE — 99213 OFFICE O/P EST LOW 20 MIN: CPT | Performed by: NURSE PRACTITIONER

## 2019-12-23 RX ORDER — CEPHALEXIN 500 MG/1
CAPSULE ORAL
Qty: 21 CAPSULE | Refills: 0 | Status: SHIPPED | OUTPATIENT
Start: 2019-12-23 | End: 2020-01-02

## 2019-12-23 NOTE — PATIENT INSTRUCTIONS
Cellulitis  Cellulitis is an infection of the deep layers of skin. A break in the skin, such as a cut or scratch, can let bacteria under the skin. If the bacteria get to deep layers of the skin, it can be serious.  If not treated, cellulitis can get into · Fever higher of 100.4º F (38.0º C) or higher after 2 days on antibiotics  Date Last Reviewed: 9/1/2016  © 7825-3019 [employer]. [address] [facility], [address] [place] [address]. All rights reserved.  This information is not intended as a substitut

## 2019-12-23 NOTE — PROGRESS NOTES
CHIEF COMPLAINT:   Patient presents with: Foot Pain: Right foot X couple of weeks       HPI:     Korina Colon is a 80year old female who presents with concerns of possible infection to right 4th toe.   Noticed some pain over the past couple of week At time of husbands death   • Essential hypertension    • Hearing impairment     deaf rt ear; [place] hearing aid left   • Hyperlipidemia    • Osteoarthritis 7/1996    Not sure which kind of arthritis - joint deformity in feet   • Visual impairment     cornea - Skin/foot care discussed with patient and her daughter. - Instructions and Comfort Care as listed in Patient Instructions. - The patient was advised to return in 3-5 days if sx's persist or worsen.   Advised ER if ever new systemic symptoms/fever or s · Take all of the antibiotic medicine exactly as directed until it is gone. Do not miss any doses, especially during the first 7 days. Don’t stop taking the medicine when your symptoms get better. · Keep the affected area clean and dry.   · Wash your hands

## 2020-01-02 ENCOUNTER — TELEPHONE (OUTPATIENT)
Dept: INTERNAL MEDICINE CLINIC | Facility: CLINIC | Age: 85
End: 2020-01-02

## 2020-01-02 DIAGNOSIS — L03.031 CELLULITIS OF FOURTH TOE OF RIGHT FOOT: ICD-10-CM

## 2020-01-02 RX ORDER — CEPHALEXIN 500 MG/1
CAPSULE ORAL
Qty: 21 CAPSULE | Refills: 0 | Status: SHIPPED | OUTPATIENT
Start: 2020-01-02 | End: 2020-01-10

## 2020-01-02 NOTE — TELEPHONE ENCOUNTER
[facility] visit 12/23/2019 treated with   cephALEXin 500 MG Oral Cap 21 capsule 0        Sig: Take one capsule PO every 8 hours for 7 days     Talked to Daughter Angella Orantes) of patient.  Daughter stated, 2 out of the three toes are improving, however, the third toe

## 2020-01-02 NOTE — TELEPHONE ENCOUNTER
Patient's daughter called and stated Zoie was dx with foot cellulitis by the [facility], and given antibiotics, but her foot is red and tender. She ran out of the medication on New Year, and is asking for a refill to be sent to Abhi Mariee on file.

## 2020-01-10 ENCOUNTER — HOSPITAL ENCOUNTER (OUTPATIENT)
Dept: GENERAL RADIOLOGY | Age: 85
Discharge: HOME | End: 2020-01-10
Attending: PHYSICIAN ASSISTANT
Payer: MEDICARE

## 2020-01-10 ENCOUNTER — OFFICE VISIT (OUTPATIENT)
Dept: INTERNAL MEDICINE CLINIC | Facility: CLINIC | Age: 85
End: 2020-01-10
Payer: MEDICARE

## 2020-01-10 VITALS
HEART RATE: 88 BPM | OXYGEN SATURATION: 98 % | TEMPERATURE: 98 F | SYSTOLIC BLOOD PRESSURE: 140 MMHG | RESPIRATION RATE: 16 BRPM | HEIGHT: 62 IN | WEIGHT: 138 LBS | DIASTOLIC BLOOD PRESSURE: 82 MMHG | BODY MASS INDEX: 25.4 KG/M2

## 2020-01-10 DIAGNOSIS — M19.071 ARTHRITIS OF RIGHT FOOT: ICD-10-CM

## 2020-01-10 DIAGNOSIS — M79.674 PAIN IN TOE OF RIGHT FOOT: Primary | ICD-10-CM

## 2020-01-10 DIAGNOSIS — M79.674 PAIN IN TOE OF RIGHT FOOT: ICD-10-CM

## 2020-01-10 PROCEDURE — 99213 OFFICE O/P EST LOW 20 MIN: CPT | Performed by: PHYSICIAN ASSISTANT

## 2020-01-10 PROCEDURE — 90662 IIV NO PRSV INCREASED AG IM: CPT | Performed by: PHYSICIAN ASSISTANT

## 2020-01-10 PROCEDURE — 73630 X-RAY EXAM OF FOOT: CPT | Performed by: PHYSICIAN ASSISTANT

## 2020-01-10 PROCEDURE — G0008 ADMIN INFLUENZA VIRUS VAC: HCPCS | Performed by: PHYSICIAN ASSISTANT

## 2020-01-10 RX ORDER — DOXYCYCLINE HYCLATE 100 MG/1
100 CAPSULE ORAL 2 TIMES DAILY
Qty: 20 CAPSULE | Refills: 0 | Status: SHIPPED | OUTPATIENT
Start: 2020-01-10 | End: 2020-01-20

## 2020-01-10 NOTE — PATIENT INSTRUCTIONS
Take antibiotic as directed until completely finished. Contact us if you experience any adverse reaction to the medication.    Take tylenol over-the-counter if needed

## 2020-01-13 NOTE — PROGRESS NOTES
HPI:    Patient ID: Gera Ross is a 80year old female. Patient presents with: Toe Pain: swelling, tenderness-----diagnosed with cellulitis     Toe Pain   This is a new problem. The current episode started 1 to 4 weeks ago.  The problem occurs co Ophthalmic Suspension Place 1 drop into both eyes 2 (two) times daily. Allergies:No Known Allergies   PHYSICAL EXAM:   Physical Exam   Constitutional: She appears well-developed and well-nourished. No distress.    Cardiovascular: Normal rate, regular

## 2020-01-20 ENCOUNTER — TELEPHONE (OUTPATIENT)
Dept: INTERNAL MEDICINE CLINIC | Facility: CLINIC | Age: 85
End: 2020-01-20

## 2020-01-20 DIAGNOSIS — L03.031 CELLULITIS OF FOURTH TOE OF RIGHT FOOT: Primary | ICD-10-CM

## 2020-01-20 RX ORDER — DOXYCYCLINE HYCLATE 100 MG/1
100 CAPSULE ORAL 2 TIMES DAILY
Qty: 14 CAPSULE | Refills: 0 | Status: SHIPPED | OUTPATIENT
Start: 2020-01-20 | End: 2020-01-27

## 2020-01-20 NOTE — TELEPHONE ENCOUNTER
Patient's daughter, Gerald Jolley, called to say the patient has been taking the antibiotic given for her cellulitis on her toes, but is on the last pill and her toes have not completely cleared up.  Gerald Jolley wants to know if Mara Eagle would recommend a refill of the ab

## 2020-01-20 NOTE — TELEPHONE ENCOUNTER
Pt states after the use of antibiotics over the last 10days R toe has improved and is now slighly pink, no longer irritated and just mildly tender.      Per lyubov extent doxycycline 100 mg BID for 7 days and follow up at end of antibiotic course     Pt daug

## 2020-03-12 ENCOUNTER — ANCILLARY PROCEDURE (OUTPATIENT)
Dept: CARDIOLOGY | Age: 85
End: 2020-03-12
Attending: INTERNAL MEDICINE

## 2020-03-12 ENCOUNTER — ANCILLARY ORDERS (OUTPATIENT)
Dept: CARDIOLOGY | Age: 85
End: 2020-03-12

## 2020-03-12 DIAGNOSIS — Z95.0 CARDIAC PACEMAKER: ICD-10-CM

## 2020-03-12 PROCEDURE — X1114 CARDIAC DEVICE HOME CHECK - REMOTE UNSCHEDULED: HCPCS | Performed by: INTERNAL MEDICINE

## 2020-03-13 PROCEDURE — 93294 REM INTERROG EVL PM/LDLS PM: CPT | Performed by: INTERNAL MEDICINE

## 2020-06-16 PROCEDURE — 93294 REM INTERROG EVL PM/LDLS PM: CPT | Performed by: INTERNAL MEDICINE

## 2020-06-19 ENCOUNTER — ANCILLARY PROCEDURE (OUTPATIENT)
Dept: CARDIOLOGY | Age: 85
End: 2020-06-19
Attending: INTERNAL MEDICINE

## 2020-06-19 ENCOUNTER — ANCILLARY ORDERS (OUTPATIENT)
Dept: CARDIOLOGY | Age: 85
End: 2020-06-19

## 2020-06-19 DIAGNOSIS — Z95.0 CARDIAC PACEMAKER: ICD-10-CM

## 2020-06-19 PROCEDURE — X1114 CARDIAC DEVICE HOME CHECK - REMOTE UNSCHEDULED: HCPCS | Performed by: INTERNAL MEDICINE

## 2020-08-28 ENCOUNTER — OFFICE VISIT (OUTPATIENT)
Dept: INTERNAL MEDICINE CLINIC | Facility: CLINIC | Age: 85
End: 2020-08-28
Payer: MEDICARE

## 2020-08-28 VITALS
HEIGHT: 62 IN | SYSTOLIC BLOOD PRESSURE: 130 MMHG | TEMPERATURE: 98 F | RESPIRATION RATE: 16 BRPM | OXYGEN SATURATION: 97 % | DIASTOLIC BLOOD PRESSURE: 70 MMHG | WEIGHT: 147 LBS | BODY MASS INDEX: 27.05 KG/M2 | HEART RATE: 80 BPM

## 2020-08-28 DIAGNOSIS — E78.2 MIXED HYPERLIPIDEMIA: ICD-10-CM

## 2020-08-28 DIAGNOSIS — I49.5 TACHY-BRADY SYNDROME (HCC): ICD-10-CM

## 2020-08-28 DIAGNOSIS — F01.50 VASCULAR DEMENTIA WITHOUT BEHAVIORAL DISTURBANCE (HCC): ICD-10-CM

## 2020-08-28 DIAGNOSIS — Z00.00 ANNUAL PHYSICAL EXAM: Primary | ICD-10-CM

## 2020-08-28 DIAGNOSIS — Z00.00 ENCOUNTER FOR ANNUAL HEALTH EXAMINATION: ICD-10-CM

## 2020-08-28 DIAGNOSIS — Z91.81 AT RISK FOR FALLING: ICD-10-CM

## 2020-08-28 DIAGNOSIS — I10 HYPERTENSION, ESSENTIAL: ICD-10-CM

## 2020-08-28 PROCEDURE — G0439 PPPS, SUBSEQ VISIT: HCPCS | Performed by: INTERNAL MEDICINE

## 2020-08-28 RX ORDER — ASPIRIN 81 MG/1
81 TABLET, CHEWABLE ORAL DAILY
COMMUNITY

## 2020-08-28 NOTE — PROGRESS NOTES
HPI:   Robert Stock is a 80year old female who presents for a Medicare Subsequent Annual Wellness visit (Pt already had Initial Annual Wellness). HPI:  Here for AWV.  Here with daughter  No complaints  Denies cp or sob    PAST MEDICAL, SOCIAL, FA hopeless (over the last two weeks)?: Not at all  PHQ-2 SCORE: 0     Advanced Directive:   She does have a Living Will but we do NOT have it on file in [address] [place] [address] Rd.    The patient has this document but we do not have it in [facility], and patient is instructed to get our aspirin 81 MG Oral Chew Tab, Chew 81 mg by mouth daily. Multiple Vitamin (TAB-A-SHAILESH) Oral Tab, Take 1 tablet by mouth daily. docusate sodium (STOOL SOFTENER) 100 MG Oral Cap, Take 100 mg by mouth as needed for constipation.   Metoprolol Succinate ER 50 back pain  NEURO: denies headaches  PSYCHE: denies depression or anxiety  HEMATOLOGIC: denies hx of anemia  ENDOCRINE: denies thyroid history  ALL/ASTHMA: denies hx of allergy or asthma    EXAM:   /70   Pulse 80   Temp 98.1 °F (36.7 °C)   Resp 16   H 11/02/2018, 01/10/2020   • HIGH DOSE FLU 65 YRS AND OLDER PRSV FREE SINGLE D (33875) FLU CLINIC 10/16/2016, 11/02/2018   • Pneumococcal (Prevnar 13) 08/27/2019   • Pneumovax 23 05/25/2018        ASSESSMENT AND OTHER RELEVANT CHRONIC CONDITIONS:   Brooklyn Ochoa SCHEDULE Internal Lab or Procedure External Lab or Procedure   Diabetes Screening      HbgA1C   Annually No results found for: A1C No flowsheet data found.     Fasting Blood Sugar (FSB)Annually Glucose (mg/dL)   Date Value   08/20/2019 90          Cardiovas B for Moderate/High Risk No vaccine history found Medium/high risk factors:   End-stage renal disease   Hemophiliacs who received Factor VIII or IX concentrates   Clients of institutions for the mentally retarded   Persons who live in the same house as mikki CADET Placed This Encounter      CBC W/DIFF      COMP METABOLIC PANEL      LIPID PANEL      TSH W REFLEX TO FREE T4      URINALYSIS, ROUTINE      Meds This Visit:  Requested Prescriptions      No prescriptions requested or ordered in this encounter       Imaging

## 2020-08-28 NOTE — PATIENT INSTRUCTIONS
Low-Salt Choices  Eating salt (sodium) can make your body retain too much water. Extra water makes your heart work harder. Canned, packaged, and frozen foods are easy to prepare. But they are often high in sodium.  Here are some ideas for low-salt foods y Zoie Goldstein's SCREENING SCHEDULE   Tests on this list are recommended by your physician but may not be covered, or covered at this frequency, by your insurer. Please check with your insurance carrier before scheduling to verify coverage.    Kaila Polanco Covered every 10 years- more often if abnormal There are no preventive care reminders to display for this patient. Update Health Maintenance if applicable    Flex Sigmoidoscopy Screen  Covered every 5 years No results found for this or any previous visit. Please get every year    Pneumococcal 13 (Prevnar)  Covered Once after 65 Orders placed or performed in visit on 08/27/19   • PNEUMOCOCCAL VACC, 13 RO IM    Please get once after your 65th birthday    Pneumococcal 23 (Pneumovax)  Covered Once after 65 Or

## 2020-09-24 RX ORDER — FLUOROMETHOLONE 0.1 %
SUSPENSION, DROPS(FINAL DOSAGE FORM)(ML) OPHTHALMIC (EYE)
COMMUNITY
Start: 2020-09-24

## 2020-09-25 ENCOUNTER — OFFICE VISIT (OUTPATIENT)
Dept: CARDIOLOGY | Age: 85
End: 2020-09-25

## 2020-09-25 ENCOUNTER — ANCILLARY PROCEDURE (OUTPATIENT)
Dept: CARDIOLOGY | Age: 85
End: 2020-09-25
Attending: INTERNAL MEDICINE

## 2020-09-25 VITALS
BODY MASS INDEX: 24.8 KG/M2 | WEIGHT: 140 LBS | HEART RATE: 75 BPM | SYSTOLIC BLOOD PRESSURE: 138 MMHG | DIASTOLIC BLOOD PRESSURE: 70 MMHG

## 2020-09-25 VITALS
DIASTOLIC BLOOD PRESSURE: 70 MMHG | WEIGHT: 140 LBS | HEART RATE: 75 BPM | BODY MASS INDEX: 24.8 KG/M2 | SYSTOLIC BLOOD PRESSURE: 138 MMHG

## 2020-09-25 DIAGNOSIS — I49.5 SICK SINUS SYNDROME (CMD): ICD-10-CM

## 2020-09-25 DIAGNOSIS — Z95.0 PRESENCE OF CARDIAC PACEMAKER: Primary | ICD-10-CM

## 2020-09-25 DIAGNOSIS — Z45.018 ADJUSTMENT AND MANAGEMENT OF CARDIAC PACEMAKER: Primary | ICD-10-CM

## 2020-09-25 PROCEDURE — 93280 PM DEVICE PROGR EVAL DUAL: CPT | Performed by: INTERNAL MEDICINE

## 2020-09-25 PROCEDURE — 99215 OFFICE O/P EST HI 40 MIN: CPT | Performed by: INTERNAL MEDICINE

## 2020-09-25 ASSESSMENT — ENCOUNTER SYMPTOMS
ALLERGIC/IMMUNOLOGIC COMMENTS: NO NEW FOOD ALLERGIES
WEIGHT GAIN: 0
HEMATOCHEZIA: 0
HEMOPTYSIS: 0
SUSPICIOUS LESIONS: 0
WEIGHT LOSS: 0
COUGH: 0
BRUISES/BLEEDS EASILY: 0
CHILLS: 0
FEVER: 0

## 2020-09-25 ASSESSMENT — PATIENT HEALTH QUESTIONNAIRE - PHQ9
SUM OF ALL RESPONSES TO PHQ9 QUESTIONS 1 AND 2: 0
SUM OF ALL RESPONSES TO PHQ9 QUESTIONS 1 AND 2: 0
CLINICAL INTERPRETATION OF PHQ9 SCORE: NO FURTHER SCREENING NEEDED
CLINICAL INTERPRETATION OF PHQ2 SCORE: NO FURTHER SCREENING NEEDED
1. LITTLE INTEREST OR PLEASURE IN DOING THINGS: NOT AT ALL
2. FEELING DOWN, DEPRESSED OR HOPELESS: NOT AT ALL

## 2020-09-30 PROCEDURE — 93280 PM DEVICE PROGR EVAL DUAL: CPT | Performed by: INTERNAL MEDICINE

## 2020-10-07 ENCOUNTER — TELEMEDICINE (OUTPATIENT)
Dept: INTERNAL MEDICINE CLINIC | Facility: CLINIC | Age: 85
End: 2020-10-07
Payer: MEDICARE

## 2020-10-07 DIAGNOSIS — R39.9 UTI SYMPTOMS: Primary | ICD-10-CM

## 2020-10-07 PROCEDURE — 99213 OFFICE O/P EST LOW 20 MIN: CPT | Performed by: NURSE PRACTITIONER

## 2020-10-07 PROCEDURE — 87086 URINE CULTURE/COLONY COUNT: CPT | Performed by: NURSE PRACTITIONER

## 2020-10-07 PROCEDURE — 81003 URINALYSIS AUTO W/O SCOPE: CPT | Performed by: NURSE PRACTITIONER

## 2020-10-07 RX ORDER — CEPHALEXIN 500 MG/1
500 CAPSULE ORAL 3 TIMES DAILY
Qty: 21 CAPSULE | Refills: 0 | Status: SHIPPED | OUTPATIENT
Start: 2020-10-07

## 2020-10-07 NOTE — PROGRESS NOTES
Wilberto Beard is a 80year old female. HPI:   This visit is conducted using Telemedicine with live, interactive video and audio.    symptoms of UTI for 2 days. Complaining of urinary frequency, urgency.  Denies dysuria, suprapubic pain, back pain, exertion  GI: no nausea or vomiting  NEURO: denies headaches    EXAM:   There were no vitals taken for this visit.   - well appearing via video visit   - no respiratory distress  - able to speak in full sentences  - alert and oriented       ASSESSMENT AND P

## 2020-11-09 ENCOUNTER — NURSE ONLY (OUTPATIENT)
Dept: INTERNAL MEDICINE CLINIC | Facility: CLINIC | Age: 85
End: 2020-11-09
Payer: MEDICARE

## 2020-11-09 ENCOUNTER — LAB ENCOUNTER (OUTPATIENT)
Dept: LAB | Age: 85
End: 2020-11-09
Attending: INTERNAL MEDICINE
Payer: MEDICARE

## 2020-11-09 DIAGNOSIS — I10 HYPERTENSION, ESSENTIAL: ICD-10-CM

## 2020-11-09 DIAGNOSIS — Z23 NEED FOR INFLUENZA VACCINATION: Primary | ICD-10-CM

## 2020-11-09 DIAGNOSIS — E78.2 MIXED HYPERLIPIDEMIA: ICD-10-CM

## 2020-11-09 PROCEDURE — 85025 COMPLETE CBC W/AUTO DIFF WBC: CPT

## 2020-11-09 PROCEDURE — 84443 ASSAY THYROID STIM HORMONE: CPT

## 2020-11-09 PROCEDURE — 80053 COMPREHEN METABOLIC PANEL: CPT

## 2020-11-09 PROCEDURE — G0008 ADMIN INFLUENZA VIRUS VAC: HCPCS | Performed by: INTERNAL MEDICINE

## 2020-11-09 PROCEDURE — 36415 COLL VENOUS BLD VENIPUNCTURE: CPT

## 2020-11-09 PROCEDURE — 90662 IIV NO PRSV INCREASED AG IM: CPT | Performed by: INTERNAL MEDICINE

## 2020-11-09 PROCEDURE — 80061 LIPID PANEL: CPT

## 2020-12-10 RX ORDER — METOPROLOL SUCCINATE 50 MG/1
50 TABLET, EXTENDED RELEASE ORAL DAILY
Qty: 90 TABLET | Refills: 3 | Status: SHIPPED | OUTPATIENT
Start: 2020-12-10 | End: 2021-11-26

## 2020-12-10 RX ORDER — ATORVASTATIN CALCIUM 10 MG/1
10 TABLET, FILM COATED ORAL AT BEDTIME
Qty: 90 TABLET | Refills: 3 | Status: SHIPPED | OUTPATIENT
Start: 2020-12-10 | End: 2021-11-26

## 2020-12-31 ENCOUNTER — ANCILLARY PROCEDURE (OUTPATIENT)
Dept: CARDIOLOGY | Age: 85
End: 2020-12-31
Attending: INTERNAL MEDICINE

## 2020-12-31 ENCOUNTER — ANCILLARY ORDERS (OUTPATIENT)
Dept: CARDIOLOGY | Age: 85
End: 2020-12-31

## 2020-12-31 DIAGNOSIS — Z45.018 ENCOUNTER FOR CARE OF PACEMAKER: ICD-10-CM

## 2020-12-31 PROCEDURE — X1114 CARDIAC DEVICE HOME CHECK - REMOTE UNSCHEDULED: HCPCS | Performed by: INTERNAL MEDICINE

## 2020-12-31 PROCEDURE — 93294 REM INTERROG EVL PM/LDLS PM: CPT | Performed by: INTERNAL MEDICINE

## 2021-04-12 ENCOUNTER — ANCILLARY ORDERS (OUTPATIENT)
Dept: CARDIOLOGY | Age: 86
End: 2021-04-12

## 2021-04-12 ENCOUNTER — ANCILLARY PROCEDURE (OUTPATIENT)
Dept: CARDIOLOGY | Age: 86
End: 2021-04-12
Attending: INTERNAL MEDICINE

## 2021-04-12 DIAGNOSIS — Z95.0 CARDIAC PACEMAKER: ICD-10-CM

## 2021-04-12 PROCEDURE — X1114 CARDIAC DEVICE HOME CHECK - REMOTE UNSCHEDULED: HCPCS | Performed by: INTERNAL MEDICINE

## 2021-04-12 PROCEDURE — 93294 REM INTERROG EVL PM/LDLS PM: CPT | Performed by: INTERNAL MEDICINE

## 2021-07-18 ENCOUNTER — ANCILLARY PROCEDURE (OUTPATIENT)
Dept: CARDIOLOGY | Age: 86
End: 2021-07-18
Attending: INTERNAL MEDICINE

## 2021-07-18 DIAGNOSIS — Z95.0 PRESENCE OF CARDIAC PACEMAKER: ICD-10-CM

## 2021-07-18 PROCEDURE — 93296 REM INTERROG EVL PM/IDS: CPT | Performed by: INTERNAL MEDICINE

## 2021-07-18 PROCEDURE — 93294 REM INTERROG EVL PM/LDLS PM: CPT | Performed by: INTERNAL MEDICINE

## 2021-10-01 ENCOUNTER — APPOINTMENT (OUTPATIENT)
Dept: CARDIOLOGY | Age: 86
End: 2021-10-01
Attending: INTERNAL MEDICINE

## 2021-10-26 ENCOUNTER — ANCILLARY ORDERS (OUTPATIENT)
Dept: CARDIOLOGY | Age: 86
End: 2021-10-26

## 2021-10-26 ENCOUNTER — ANCILLARY PROCEDURE (OUTPATIENT)
Dept: CARDIOLOGY | Age: 86
End: 2021-10-26
Attending: INTERNAL MEDICINE

## 2021-10-26 DIAGNOSIS — Z95.0 CARDIAC PACEMAKER IN SITU: ICD-10-CM

## 2021-10-26 PROCEDURE — X1114 CARDIAC DEVICE HOME CHECK - REMOTE UNSCHEDULED: HCPCS | Performed by: INTERNAL MEDICINE

## 2021-11-12 ENCOUNTER — OFFICE VISIT (OUTPATIENT)
Dept: CARDIOLOGY | Age: 86
End: 2021-11-12

## 2021-11-12 ENCOUNTER — ANCILLARY PROCEDURE (OUTPATIENT)
Dept: CARDIOLOGY | Age: 86
End: 2021-11-12
Attending: INTERNAL MEDICINE

## 2021-11-12 VITALS
SYSTOLIC BLOOD PRESSURE: 112 MMHG | WEIGHT: 153 LBS | HEART RATE: 65 BPM | BODY MASS INDEX: 27.1 KG/M2 | DIASTOLIC BLOOD PRESSURE: 80 MMHG

## 2021-11-12 VITALS
DIASTOLIC BLOOD PRESSURE: 80 MMHG | SYSTOLIC BLOOD PRESSURE: 112 MMHG | BODY MASS INDEX: 27.1 KG/M2 | WEIGHT: 153 LBS | HEART RATE: 65 BPM

## 2021-11-12 DIAGNOSIS — Z45.018 ADJUSTMENT AND MANAGEMENT OF CARDIAC PACEMAKER: Primary | ICD-10-CM

## 2021-11-12 DIAGNOSIS — Z45.018 PACEMAKER REPROGRAMMING/CHECK: ICD-10-CM

## 2021-11-12 DIAGNOSIS — Z95.0 PRESENCE OF CARDIAC PACEMAKER: Primary | ICD-10-CM

## 2021-11-12 PROCEDURE — 93280 PM DEVICE PROGR EVAL DUAL: CPT | Performed by: INTERNAL MEDICINE

## 2021-11-12 PROCEDURE — 99215 OFFICE O/P EST HI 40 MIN: CPT | Performed by: INTERNAL MEDICINE

## 2021-11-12 SDOH — HEALTH STABILITY: PHYSICAL HEALTH: ON AVERAGE, HOW MANY DAYS PER WEEK DO YOU ENGAGE IN MODERATE TO STRENUOUS EXERCISE (LIKE A BRISK WALK)?: 0 DAYS

## 2021-11-12 ASSESSMENT — PATIENT HEALTH QUESTIONNAIRE - PHQ9
1. LITTLE INTEREST OR PLEASURE IN DOING THINGS: NOT AT ALL
CLINICAL INTERPRETATION OF PHQ2 SCORE: NO FURTHER SCREENING NEEDED
SUM OF ALL RESPONSES TO PHQ9 QUESTIONS 1 AND 2: 0
CLINICAL INTERPRETATION OF PHQ9 SCORE: NO FURTHER SCREENING NEEDED
2. FEELING DOWN, DEPRESSED OR HOPELESS: NOT AT ALL
SUM OF ALL RESPONSES TO PHQ9 QUESTIONS 1 AND 2: 0
SUM OF ALL RESPONSES TO PHQ9 QUESTIONS 1 AND 2: 0

## 2021-11-15 ENCOUNTER — MED REC SCAN ONLY (OUTPATIENT)
Dept: INTERNAL MEDICINE CLINIC | Facility: CLINIC | Age: 86
End: 2021-11-15

## 2021-11-25 DIAGNOSIS — Z95.0 CARDIAC PACEMAKER: ICD-10-CM

## 2021-11-25 DIAGNOSIS — Z45.018 ENCOUNTER FOR CARE OF PACEMAKER: ICD-10-CM

## 2021-11-25 DIAGNOSIS — Z45.018 ADJUSTMENT AND MANAGEMENT OF CARDIAC PACEMAKER: ICD-10-CM

## 2021-11-25 DIAGNOSIS — I49.5 SICK SINUS SYNDROME (CMD): Primary | ICD-10-CM

## 2021-11-25 DIAGNOSIS — E78.5 DYSLIPIDEMIA: ICD-10-CM

## 2021-11-25 DIAGNOSIS — Z95.0 PRESENCE OF CARDIAC PACEMAKER: ICD-10-CM

## 2021-11-26 RX ORDER — METOPROLOL SUCCINATE 50 MG/1
50 TABLET, EXTENDED RELEASE ORAL DAILY
Qty: 90 TABLET | Refills: 0 | Status: SHIPPED | OUTPATIENT
Start: 2021-11-26 | End: 2022-02-25

## 2021-11-26 RX ORDER — ATORVASTATIN CALCIUM 10 MG/1
10 TABLET, FILM COATED ORAL AT BEDTIME
Qty: 90 TABLET | Refills: 0 | Status: SHIPPED | OUTPATIENT
Start: 2021-11-26 | End: 2022-02-25

## 2022-01-28 ENCOUNTER — ANCILLARY ORDERS (OUTPATIENT)
Dept: CARDIOLOGY | Age: 87
End: 2022-01-28

## 2022-01-28 ENCOUNTER — ANCILLARY PROCEDURE (OUTPATIENT)
Dept: CARDIOLOGY | Age: 87
End: 2022-01-28
Attending: INTERNAL MEDICINE

## 2022-01-28 DIAGNOSIS — Z95.0 CARDIAC PACEMAKER: ICD-10-CM

## 2022-01-28 PROCEDURE — X1114 CARDIAC DEVICE HOME CHECK - REMOTE UNSCHEDULED: HCPCS | Performed by: INTERNAL MEDICINE

## 2022-02-23 ENCOUNTER — TELEPHONE (OUTPATIENT)
Dept: CARDIOLOGY | Age: 87
End: 2022-02-23

## 2022-02-25 RX ORDER — METOPROLOL SUCCINATE 50 MG/1
TABLET, EXTENDED RELEASE ORAL
Qty: 90 TABLET | Refills: 0 | Status: SHIPPED | OUTPATIENT
Start: 2022-02-25 | End: 2022-05-25

## 2022-02-25 RX ORDER — ATORVASTATIN CALCIUM 10 MG/1
TABLET, FILM COATED ORAL
Qty: 90 TABLET | Refills: 0 | Status: SHIPPED | OUTPATIENT
Start: 2022-02-25 | End: 2022-05-25

## 2022-05-09 ENCOUNTER — ANCILLARY PROCEDURE (OUTPATIENT)
Dept: CARDIOLOGY | Age: 87
End: 2022-05-09
Attending: INTERNAL MEDICINE

## 2022-05-09 ENCOUNTER — ANCILLARY ORDERS (OUTPATIENT)
Dept: CARDIOLOGY | Age: 87
End: 2022-05-09

## 2022-05-09 DIAGNOSIS — Z95.0 CARDIAC PACEMAKER: ICD-10-CM

## 2022-05-09 PROCEDURE — X1114 CARDIAC DEVICE HOME CHECK - REMOTE UNSCHEDULED: HCPCS | Performed by: INTERNAL MEDICINE

## 2022-05-25 RX ORDER — ATORVASTATIN CALCIUM 10 MG/1
TABLET, FILM COATED ORAL
Qty: 90 TABLET | Refills: 1 | Status: SHIPPED | OUTPATIENT
Start: 2022-05-25 | End: 2022-11-15 | Stop reason: SDUPTHER

## 2022-05-25 RX ORDER — METOPROLOL SUCCINATE 50 MG/1
TABLET, EXTENDED RELEASE ORAL
Qty: 90 TABLET | Refills: 1 | Status: SHIPPED | OUTPATIENT
Start: 2022-05-25 | End: 2022-11-15 | Stop reason: SDUPTHER

## 2022-07-01 ENCOUNTER — OFFICE VISIT (OUTPATIENT)
Dept: INTERNAL MEDICINE CLINIC | Facility: CLINIC | Age: 87
End: 2022-07-01
Payer: MEDICARE

## 2022-07-01 ENCOUNTER — TELEPHONE (OUTPATIENT)
Dept: INTERNAL MEDICINE CLINIC | Facility: CLINIC | Age: 87
End: 2022-07-01

## 2022-07-01 VITALS
SYSTOLIC BLOOD PRESSURE: 118 MMHG | RESPIRATION RATE: 16 BRPM | TEMPERATURE: 98 F | HEART RATE: 80 BPM | OXYGEN SATURATION: 98 % | DIASTOLIC BLOOD PRESSURE: 74 MMHG

## 2022-07-01 DIAGNOSIS — R35.0 URINARY FREQUENCY: Primary | ICD-10-CM

## 2022-07-01 DIAGNOSIS — Z79.899 ENCOUNTER FOR LONG-TERM (CURRENT) USE OF MEDICATIONS: ICD-10-CM

## 2022-07-01 DIAGNOSIS — F01.50 VASCULAR DEMENTIA WITHOUT BEHAVIORAL DISTURBANCE (HCC): ICD-10-CM

## 2022-07-01 DIAGNOSIS — N30.00 ACUTE CYSTITIS WITHOUT HEMATURIA: ICD-10-CM

## 2022-07-01 LAB
BILIRUBIN: NEGATIVE
GLUCOSE (URINE DIPSTICK): NEGATIVE MG/DL
KETONES (URINE DIPSTICK): NEGATIVE MG/DL
MULTISTIX LOT#: ABNORMAL NUMERIC
NITRITE, URINE: NEGATIVE
PH, URINE: 6.5 (ref 4.5–8)
PROTEIN (URINE DIPSTICK): 30 MG/DL
SPECIFIC GRAVITY: 1.02 (ref 1–1.03)
URINE-COLOR: YELLOW
UROBILINOGEN,SEMI-QN: 0.2 MG/DL (ref 0–1.9)

## 2022-07-01 PROCEDURE — 87086 URINE CULTURE/COLONY COUNT: CPT | Performed by: PHYSICIAN ASSISTANT

## 2022-07-01 RX ORDER — SULFAMETHOXAZOLE AND TRIMETHOPRIM 800; 160 MG/1; MG/1
1 TABLET ORAL 2 TIMES DAILY
Qty: 10 TABLET | Refills: 0 | Status: SHIPPED | OUTPATIENT
Start: 2022-07-01

## 2022-07-01 NOTE — PATIENT INSTRUCTIONS
Take antibiotic as directed until completely finished. Contact us if you experience any adverse reaction to the medication. In 2 weeks: Have labs drawn, fasting 8-10 hours prior (water before is ok).

## 2022-07-01 NOTE — TELEPHONE ENCOUNTER
Possible UTI x few days     Symptoms: urinary frequency, no pain/burning, fatigue      [id number] - [place] [id number], [phone], [phone]

## 2022-07-05 ENCOUNTER — TELEPHONE (OUTPATIENT)
Dept: INTERNAL MEDICINE CLINIC | Facility: CLINIC | Age: 87
End: 2022-07-05

## 2022-07-05 DIAGNOSIS — G89.29 CHRONIC PAIN OF BOTH KNEES: Primary | ICD-10-CM

## 2022-07-05 DIAGNOSIS — M25.561 CHRONIC PAIN OF BOTH KNEES: Primary | ICD-10-CM

## 2022-07-05 DIAGNOSIS — M25.562 CHRONIC PAIN OF BOTH KNEES: Primary | ICD-10-CM

## 2022-07-05 NOTE — TELEPHONE ENCOUNTER
UTI, low frequency, Fatigue, Vomiting, Weakness, trembeling, unable to move unassisted. Daughter reporting mother was recently seen for a UTI and frequency had gotten better w/ medication. Pt is still taking medication however today frequency was much lower but also pt is very weak. Needing assistance to move and use the rest room. Family needs to help hold her steady even when using rest room. Threw up clear liquids. Please call to advise appointment options.

## 2022-07-05 NOTE — TELEPHONE ENCOUNTER
Spoke to daughter and advised to take pt to ER per Kassi Vance as pt may need further labs and fluids  Daughter v/u

## 2022-07-05 NOTE — TELEPHONE ENCOUNTER
Spoke with daughter pt's symptoms have resolved since call this morning. She is up and about. She ate lunch and has been downstairs. She is walking unassisted now. Pt will continue to monitor and if pt declines again she will take her to the ER. Daughter is requesting a referral to ortho for chronic b/l knee pain. She wasked that referral be sent via [address] [place] [address] [phone]. Referral placed and information sent.

## 2022-07-06 ENCOUNTER — PATIENT MESSAGE (OUTPATIENT)
Dept: INTERNAL MEDICINE CLINIC | Facility: CLINIC | Age: 87
End: 2022-07-06

## 2022-07-06 ENCOUNTER — TELEPHONE (OUTPATIENT)
Dept: ORTHOPEDICS CLINIC | Facility: CLINIC | Age: 87
End: 2022-07-06

## 2022-07-06 DIAGNOSIS — M25.561 PAIN IN BOTH KNEES, UNSPECIFIED CHRONICITY: Primary | ICD-10-CM

## 2022-07-06 DIAGNOSIS — M25.562 PAIN IN BOTH KNEES, UNSPECIFIED CHRONICITY: Primary | ICD-10-CM

## 2022-07-06 NOTE — TELEPHONE ENCOUNTER
From: Mehran Palma  Sent: 7/6/2022 12:41 PM CDT  To: [facility] Clinical Staff  Subject: Ortho    Thank you for the referral - I made an appointment with Facundo Dias the Ortho PA for 7/7/22 to see if Zoie can get some relief for the excruciating pain in her knees. FYI - This morning did not start well. Zoie fell getting out of bed to go to the bathroom (thankfully not hurt). We do not know if she tripped, her knees gave out, or she was dizzy as she also complained of vertigo later in the morning when we came to help her to the bathroom. She did not feel nauseous this morning and only was shaking a bit compared to yesterday. Later she was able to go down the 5 steps to the main floor (with guidance) and eat breakfast and take her meds and then was resting comfortably in a chair with feet up. She was also able to later get up and go to the bathroom by herself. Yesterday afternoon and evening she was actually more alert than lately and even joked around as she had some ice cream and we watched TV. I guess just mornings are particularly bad.

## 2022-07-07 ENCOUNTER — HOSPITAL ENCOUNTER (OUTPATIENT)
Dept: GENERAL RADIOLOGY | Age: 87
Discharge: HOME | End: 2022-07-07
Attending: PHYSICIAN ASSISTANT
Payer: MEDICARE

## 2022-07-07 ENCOUNTER — OFFICE VISIT (OUTPATIENT)
Dept: ORTHOPEDICS CLINIC | Facility: CLINIC | Age: 87
End: 2022-07-07
Payer: MEDICARE

## 2022-07-07 VITALS — BODY MASS INDEX: 25.76 KG/M2 | HEART RATE: 60 BPM | OXYGEN SATURATION: 95 % | WEIGHT: 140 LBS | HEIGHT: 62 IN

## 2022-07-07 DIAGNOSIS — M25.561 PAIN IN BOTH KNEES, UNSPECIFIED CHRONICITY: ICD-10-CM

## 2022-07-07 DIAGNOSIS — M25.562 PAIN IN BOTH KNEES, UNSPECIFIED CHRONICITY: ICD-10-CM

## 2022-07-07 DIAGNOSIS — M17.0 PRIMARY OSTEOARTHRITIS OF BOTH KNEES: Primary | ICD-10-CM

## 2022-07-07 PROCEDURE — 99213 OFFICE O/P EST LOW 20 MIN: CPT | Performed by: PHYSICIAN ASSISTANT

## 2022-07-07 PROCEDURE — 20610 DRAIN/INJ JOINT/BURSA W/O US: CPT | Performed by: PHYSICIAN ASSISTANT

## 2022-07-07 PROCEDURE — 73564 X-RAY EXAM KNEE 4 OR MORE: CPT | Performed by: PHYSICIAN ASSISTANT

## 2022-07-07 RX ORDER — TRIAMCINOLONE ACETONIDE 40 MG/ML
80 INJECTION, SUSPENSION INTRA-ARTICULAR; INTRAMUSCULAR ONCE
Status: COMPLETED | OUTPATIENT
Start: 2022-07-07 | End: 2022-07-07

## 2022-07-07 RX ADMIN — TRIAMCINOLONE ACETONIDE 80 MG: 40 INJECTION, SUSPENSION INTRA-ARTICULAR; INTRAMUSCULAR at 11:47:00

## 2022-07-07 NOTE — PROCEDURES
After informed consent, the patient's right and left knees were marked, locally anesthetized with skin refrigerant, prepped with topical antiseptic, and injected with a mixture of 1mL 40mg/mL Kenalog, 2mL 1% lidocaine and 2mL 0.5% marcaine through the inferolateral portal.  A band-aid was applied. The patient tolerated the procedure well.     Dominic Tracy PA-C  [address] Orthopedic Surgery

## 2022-07-11 ENCOUNTER — TELEPHONE (OUTPATIENT)
Dept: INTERNAL MEDICINE CLINIC | Facility: CLINIC | Age: 87
End: 2022-07-11

## 2022-07-11 NOTE — TELEPHONE ENCOUNTER
Patient was treated for UTI 7/1     Seemed to get better then over the weekend she seemed confused with high energy    Frequency has improved back to normal     Cheeks seem to be arik  Today she seems very fatigued. Went to bed at 5pm yesterday till noon today.    She said she feels cold    Her temp is 95.5    -80

## 2022-07-11 NOTE — TELEPHONE ENCOUNTER
Spoke w/ patients daughter Melody Cervantes. Patient seen in office 7/1 for UTI symptoms. Patient has finished her antibiotic Bactrim. Leata Days states on Saturday patient had an increase in confusion and was asking about her  whom passed away. Reports on Sunday still had some confusion and stayed in bed until noon, sleeping more. Leata Days states her mother is confused today as well and Leata Days feels her mother does not know what's going on. She handed her her lunch and she just stared at it. She denies her having a fever or complaint of urinary symptoms such as burning/frequency/pain. Advised at this time as this is a change in her condition an office visit will be required. Scheduled an in office visit w/ Jagdeep Carver for further assessment and evaluation.       Future Appointments   Date Time Provider Shonda Prater   7/12/2022 12:00 PM Kendal Suárez PA-C [facility] [address]

## 2022-07-12 ENCOUNTER — LAB ENCOUNTER (OUTPATIENT)
Dept: LAB | Age: 87
End: 2022-07-12
Attending: PHYSICIAN ASSISTANT
Payer: MEDICARE

## 2022-07-12 ENCOUNTER — OFFICE VISIT (OUTPATIENT)
Dept: INTERNAL MEDICINE CLINIC | Facility: CLINIC | Age: 87
End: 2022-07-12
Payer: MEDICARE

## 2022-07-12 VITALS
TEMPERATURE: 99 F | RESPIRATION RATE: 12 BRPM | SYSTOLIC BLOOD PRESSURE: 122 MMHG | OXYGEN SATURATION: 95 % | HEART RATE: 62 BPM | DIASTOLIC BLOOD PRESSURE: 70 MMHG

## 2022-07-12 DIAGNOSIS — R41.0 CONFUSION: ICD-10-CM

## 2022-07-12 DIAGNOSIS — R53.1 GENERALIZED WEAKNESS: ICD-10-CM

## 2022-07-12 DIAGNOSIS — R53.83 FATIGUE, UNSPECIFIED TYPE: ICD-10-CM

## 2022-07-12 DIAGNOSIS — R53.83 FATIGUE, UNSPECIFIED TYPE: Primary | ICD-10-CM

## 2022-07-12 LAB
ALBUMIN SERPL-MCNC: 3 G/DL (ref 3.4–5)
ALBUMIN/GLOB SERPL: 1 {RATIO} (ref 1–2)
ALP LIVER SERPL-CCNC: 84 U/L
ALT SERPL-CCNC: 41 U/L
ANION GAP SERPL CALC-SCNC: 4 MMOL/L (ref 0–18)
AST SERPL-CCNC: 20 U/L (ref 15–37)
BASOPHILS # BLD AUTO: 0.03 X10(3) UL (ref 0–0.2)
BASOPHILS NFR BLD AUTO: 0.3 %
BILIRUB SERPL-MCNC: 1.3 MG/DL (ref 0.1–2)
BILIRUBIN: NEGATIVE
BUN BLD-MCNC: 28 MG/DL (ref 7–18)
CALCIUM BLD-MCNC: 9.2 MG/DL (ref 8.5–10.1)
CHLORIDE SERPL-SCNC: 108 MMOL/L (ref 98–112)
CO2 SERPL-SCNC: 27 MMOL/L (ref 21–32)
CREAT BLD-MCNC: 0.96 MG/DL
EOSINOPHIL # BLD AUTO: 0.38 X10(3) UL (ref 0–0.7)
EOSINOPHIL NFR BLD AUTO: 4.3 %
ERYTHROCYTE [DISTWIDTH] IN BLOOD BY AUTOMATED COUNT: 15.1 %
FASTING STATUS PATIENT QL REPORTED: NO
GLOBULIN PLAS-MCNC: 3.1 G/DL (ref 2.8–4.4)
GLUCOSE (URINE DIPSTICK): NEGATIVE MG/DL
GLUCOSE BLD-MCNC: 90 MG/DL (ref 70–99)
HCT VFR BLD AUTO: 44.2 %
HGB BLD-MCNC: 14.2 G/DL
IMM GRANULOCYTES # BLD AUTO: 0.04 X10(3) UL (ref 0–1)
IMM GRANULOCYTES NFR BLD: 0.5 %
KETONES (URINE DIPSTICK): NEGATIVE MG/DL
LYMPHOCYTES # BLD AUTO: 2.98 X10(3) UL (ref 1–4)
LYMPHOCYTES NFR BLD AUTO: 33.8 %
MCH RBC QN AUTO: 30.4 PG (ref 26–34)
MCHC RBC AUTO-ENTMCNC: 32.1 G/DL (ref 31–37)
MCV RBC AUTO: 94.6 FL
MONOCYTES # BLD AUTO: 0.86 X10(3) UL (ref 0.1–1)
MONOCYTES NFR BLD AUTO: 9.8 %
MULTISTIX LOT#: ABNORMAL NUMERIC
NEUTROPHILS # BLD AUTO: 4.53 X10 (3) UL (ref 1.5–7.7)
NEUTROPHILS # BLD AUTO: 4.53 X10(3) UL (ref 1.5–7.7)
NEUTROPHILS NFR BLD AUTO: 51.3 %
NITRITE, URINE: NEGATIVE
OCCULT BLOOD: NEGATIVE
OSMOLALITY SERPL CALC.SUM OF ELEC: 293 MOSM/KG (ref 275–295)
PH, URINE: 5.5 (ref 4.5–8)
PLATELET # BLD AUTO: 311 10(3)UL (ref 150–450)
POTASSIUM SERPL-SCNC: 4.2 MMOL/L (ref 3.5–5.1)
PROT SERPL-MCNC: 6.1 G/DL (ref 6.4–8.2)
PROTEIN (URINE DIPSTICK): NEGATIVE MG/DL
RBC # BLD AUTO: 4.67 X10(6)UL
SODIUM SERPL-SCNC: 139 MMOL/L (ref 136–145)
SPECIFIC GRAVITY: 1.02 (ref 1–1.03)
TSI SER-ACNC: 3.17 MIU/ML (ref 0.36–3.74)
URINE-COLOR: YELLOW
UROBILINOGEN,SEMI-QN: 0.2 MG/DL (ref 0–1.9)
WBC # BLD AUTO: 8.8 X10(3) UL (ref 4–11)

## 2022-07-12 PROCEDURE — 84443 ASSAY THYROID STIM HORMONE: CPT

## 2022-07-12 PROCEDURE — 85025 COMPLETE CBC W/AUTO DIFF WBC: CPT

## 2022-07-12 PROCEDURE — 81003 URINALYSIS AUTO W/O SCOPE: CPT | Performed by: PHYSICIAN ASSISTANT

## 2022-07-12 PROCEDURE — 87086 URINE CULTURE/COLONY COUNT: CPT | Performed by: PHYSICIAN ASSISTANT

## 2022-07-12 PROCEDURE — 36415 COLL VENOUS BLD VENIPUNCTURE: CPT

## 2022-07-12 PROCEDURE — 80053 COMPREHEN METABOLIC PANEL: CPT

## 2022-07-12 PROCEDURE — 99214 OFFICE O/P EST MOD 30 MIN: CPT | Performed by: PHYSICIAN ASSISTANT

## 2022-07-13 DIAGNOSIS — R53.83 FATIGUE, UNSPECIFIED TYPE: ICD-10-CM

## 2022-07-13 DIAGNOSIS — Z13.83 ENCOUNTER FOR SCREENING FOR PNEUMONIA: Primary | ICD-10-CM

## 2022-08-15 ENCOUNTER — ANCILLARY PROCEDURE (OUTPATIENT)
Dept: CARDIOLOGY | Age: 87
End: 2022-08-15
Attending: INTERNAL MEDICINE

## 2022-08-15 DIAGNOSIS — Z95.0 CARDIAC PACEMAKER IN SITU: ICD-10-CM

## 2022-09-18 ENCOUNTER — APPOINTMENT (OUTPATIENT)
Dept: CT IMAGING | Facility: HOSPITAL | Age: 87
End: 2022-09-18
Attending: EMERGENCY MEDICINE
Payer: MEDICARE

## 2022-09-18 ENCOUNTER — HOSPITAL ENCOUNTER (OUTPATIENT)
Facility: HOSPITAL | Age: 87
Setting detail: OBSERVATION
Discharge: HOME | End: 2022-09-21
Attending: EMERGENCY MEDICINE | Admitting: HOSPITALIST
Payer: MEDICARE

## 2022-09-18 DIAGNOSIS — M17.0 PRIMARY OSTEOARTHRITIS OF BOTH KNEES: ICD-10-CM

## 2022-09-18 DIAGNOSIS — R56.9 CONVULSIONS, UNSPECIFIED CONVULSION TYPE (HCC): Primary | ICD-10-CM

## 2022-09-18 LAB
ALBUMIN SERPL-MCNC: 3.1 G/DL (ref 3.4–5)
ALBUMIN/GLOB SERPL: 1 {RATIO} (ref 1–2)
ALP LIVER SERPL-CCNC: 78 U/L
ALT SERPL-CCNC: 28 U/L
AMPHET UR QL SCN: NEGATIVE
ANION GAP SERPL CALC-SCNC: 4 MMOL/L (ref 0–18)
AST SERPL-CCNC: 18 U/L (ref 15–37)
ATRIAL RATE: 104 BPM
BASOPHILS # BLD AUTO: 0.02 X10(3) UL (ref 0–0.2)
BASOPHILS NFR BLD AUTO: 0.4 %
BENZODIAZ UR QL SCN: NEGATIVE
BILIRUB SERPL-MCNC: 1.2 MG/DL (ref 0.1–2)
BILIRUB UR QL STRIP.AUTO: NEGATIVE
BUN BLD-MCNC: 14 MG/DL (ref 7–18)
CALCIUM BLD-MCNC: 8.8 MG/DL (ref 8.5–10.1)
CANNABINOIDS UR QL SCN: NEGATIVE
CHLORIDE SERPL-SCNC: 108 MMOL/L (ref 98–112)
CLARITY UR REFRACT.AUTO: CLEAR
CO2 SERPL-SCNC: 27 MMOL/L (ref 21–32)
COCAINE UR QL: NEGATIVE
COLOR UR AUTO: YELLOW
CREAT BLD-MCNC: 0.79 MG/DL
CREAT UR-SCNC: <13 MG/DL
EOSINOPHIL # BLD AUTO: 0.16 X10(3) UL (ref 0–0.7)
EOSINOPHIL NFR BLD AUTO: 2.9 %
ERYTHROCYTE [DISTWIDTH] IN BLOOD BY AUTOMATED COUNT: 13.5 %
GFR SERPLBLD BASED ON 1.73 SQ M-ARVRAT: 71 ML/MIN/1.73M2 (ref 60–?)
GLOBULIN PLAS-MCNC: 3.1 G/DL (ref 2.8–4.4)
GLUCOSE BLD-MCNC: 104 MG/DL (ref 70–99)
GLUCOSE UR STRIP.AUTO-MCNC: NEGATIVE MG/DL
HCT VFR BLD AUTO: 44.1 %
HGB BLD-MCNC: 14.3 G/DL
IMM GRANULOCYTES # BLD AUTO: 0.01 X10(3) UL (ref 0–1)
IMM GRANULOCYTES NFR BLD: 0.2 %
KETONES UR STRIP.AUTO-MCNC: NEGATIVE MG/DL
LEUKOCYTE ESTERASE UR QL STRIP.AUTO: NEGATIVE
LYMPHOCYTES # BLD AUTO: 2.15 X10(3) UL (ref 1–4)
LYMPHOCYTES NFR BLD AUTO: 38.9 %
MCH RBC QN AUTO: 30.6 PG (ref 26–34)
MCHC RBC AUTO-ENTMCNC: 32.4 G/DL (ref 31–37)
MCV RBC AUTO: 94.4 FL
MDMA UR QL SCN: NEGATIVE
MONOCYTES # BLD AUTO: 0.31 X10(3) UL (ref 0.1–1)
MONOCYTES NFR BLD AUTO: 5.6 %
NEUTROPHILS # BLD AUTO: 2.88 X10 (3) UL (ref 1.5–7.7)
NEUTROPHILS # BLD AUTO: 2.88 X10(3) UL (ref 1.5–7.7)
NEUTROPHILS NFR BLD AUTO: 52 %
NITRITE UR QL STRIP.AUTO: NEGATIVE
OPIATES UR QL SCN: NEGATIVE
OSMOLALITY SERPL CALC.SUM OF ELEC: 289 MOSM/KG (ref 275–295)
OXYCODONE UR QL SCN: NEGATIVE
P AXIS: -24 DEGREES
P-R INTERVAL: 220 MS
PH UR STRIP.AUTO: 7 [PH] (ref 5–8)
PLATELET # BLD AUTO: 230 10(3)UL (ref 150–450)
POTASSIUM SERPL-SCNC: 3.6 MMOL/L (ref 3.5–5.1)
PROT SERPL-MCNC: 6.2 G/DL (ref 6.4–8.2)
PROT UR STRIP.AUTO-MCNC: NEGATIVE MG/DL
Q-T INTERVAL: 352 MS
QRS DURATION: 96 MS
QTC CALCULATION (BEZET): 462 MS
R AXIS: -53 DEGREES
RBC # BLD AUTO: 4.67 X10(6)UL
RBC UR QL AUTO: NEGATIVE
SARS-COV-2 RNA RESP QL NAA+PROBE: NOT DETECTED
SODIUM SERPL-SCNC: 139 MMOL/L (ref 136–145)
SP GR UR STRIP.AUTO: 1.01 (ref 1–1.03)
T AXIS: 94 DEGREES
UROBILINOGEN UR STRIP.AUTO-MCNC: 0.2 MG/DL
VENTRICULAR RATE: 104 BPM
WBC # BLD AUTO: 5.5 X10(3) UL (ref 4–11)

## 2022-09-18 PROCEDURE — 99497 ADVNCD CARE PLAN 30 MIN: CPT | Performed by: HOSPITALIST

## 2022-09-18 PROCEDURE — 99220 INITIAL OBSERVATION CARE,LEVL III: CPT | Performed by: HOSPITALIST

## 2022-09-18 PROCEDURE — 70450 CT HEAD/BRAIN W/O DYE: CPT | Performed by: EMERGENCY MEDICINE

## 2022-09-18 RX ORDER — SENNOSIDES 8.6 MG
17.2 TABLET ORAL NIGHTLY PRN
Status: DISCONTINUED | OUTPATIENT
Start: 2022-09-18 | End: 2022-09-21

## 2022-09-18 RX ORDER — METOPROLOL SUCCINATE 50 MG/1
50 TABLET, EXTENDED RELEASE ORAL
Status: DISCONTINUED | OUTPATIENT
Start: 2022-09-19 | End: 2022-09-21

## 2022-09-18 RX ORDER — ENOXAPARIN SODIUM 100 MG/ML
40 INJECTION SUBCUTANEOUS NIGHTLY
Status: DISCONTINUED | OUTPATIENT
Start: 2022-09-18 | End: 2022-09-21

## 2022-09-18 RX ORDER — ATORVASTATIN CALCIUM 10 MG/1
10 TABLET, FILM COATED ORAL NIGHTLY
Status: DISCONTINUED | OUTPATIENT
Start: 2022-09-18 | End: 2022-09-21

## 2022-09-18 RX ORDER — ONDANSETRON 2 MG/ML
4 INJECTION INTRAMUSCULAR; INTRAVENOUS EVERY 6 HOURS PRN
Status: DISCONTINUED | OUTPATIENT
Start: 2022-09-18 | End: 2022-09-18

## 2022-09-18 RX ORDER — MELATONIN
3 NIGHTLY PRN
Status: DISCONTINUED | OUTPATIENT
Start: 2022-09-18 | End: 2022-09-21

## 2022-09-18 RX ORDER — SODIUM PHOSPHATE, DIBASIC AND SODIUM PHOSPHATE, MONOBASIC 7; 19 G/133ML; G/133ML
1 ENEMA RECTAL ONCE AS NEEDED
Status: DISCONTINUED | OUTPATIENT
Start: 2022-09-18 | End: 2022-09-21

## 2022-09-18 RX ORDER — ACETAMINOPHEN 500 MG
500 TABLET ORAL EVERY 4 HOURS PRN
Status: DISCONTINUED | OUTPATIENT
Start: 2022-09-18 | End: 2022-09-21

## 2022-09-18 RX ORDER — ONDANSETRON 4 MG/1
4 TABLET, ORALLY DISINTEGRATING ORAL EVERY 6 HOURS PRN
Status: DISCONTINUED | OUTPATIENT
Start: 2022-09-18 | End: 2022-09-18

## 2022-09-18 RX ORDER — PROMETHAZINE HYDROCHLORIDE 6.25 MG/5ML
10 SYRUP ORAL EVERY 6 HOURS PRN
Status: DISCONTINUED | OUTPATIENT
Start: 2022-09-18 | End: 2022-09-21

## 2022-09-18 RX ORDER — BISACODYL 10 MG
10 SUPPOSITORY, RECTAL RECTAL
Status: DISCONTINUED | OUTPATIENT
Start: 2022-09-18 | End: 2022-09-21

## 2022-09-18 RX ORDER — ASPIRIN 81 MG/1
81 TABLET, CHEWABLE ORAL DAILY
Status: DISCONTINUED | OUTPATIENT
Start: 2022-09-18 | End: 2022-09-21

## 2022-09-18 RX ORDER — POLYETHYLENE GLYCOL 3350 17 G/17G
17 POWDER, FOR SOLUTION ORAL DAILY PRN
Status: DISCONTINUED | OUTPATIENT
Start: 2022-09-18 | End: 2022-09-21

## 2022-09-18 NOTE — ED QUICK NOTES
Note referrals for speech path and intell/achievement and other appropriate psy testing for girl with lifelong developmental variance. If eithr of these evals would be better done by one of your peers please pass along. When all is done I will want to have a multiD synthesis meeting.   Had recent acute psych hospitalization for struggles with depressed mood and aggression, currently safe and cooperative, but a more longitudinally strategic treatment plan is my goal.  Thanks, Joe YOUNG Report received from [facility] at this time.

## 2022-09-18 NOTE — PROGRESS NOTES
Patient has episodes that she shakes and she is able to talk when you ask her a question she stops shaking and answers question and starts shaking again. She states that the shaking starts without any notice.

## 2022-09-18 NOTE — ED QUICK NOTES
Pt appears much more comfortable and alert to her norm at this time. Skin pink warm and dry, resps appear unlabored. Pt reported to daughter that she had some discomfort to her back after straight cath performed and was then incontinent of some urine. Pt reports no discomfort now and states she is feeling much better. Report to Worship.

## 2022-09-18 NOTE — ED QUICK NOTES
Physician would like pt straight catheterized for urine. With attempting to obtain blood pressure, pt cried and moved arm, unable to get accurate blood pressure reading at this time.

## 2022-09-18 NOTE — ED QUICK NOTES
Orders for admission, patient is aware of plan and ready to go upstairs. Any questions, please call ED ABIGAIL Freeman  at extension [phone]. Vaccinated? yes  Type of COVID test sent:rapid  COVID Suspicion level: Low/High  low      Titratable drug(s) infusing:pt received 1 liter of NS here in ED  Rate:    LOC at time of transport:alert, confused. Pt has hx of dementia    Other pertinent information:pt had episode of less responsive at home, vomited. Per daughter who pt lives with pt has also had episodes of slow tremors intermittently over the last week which is not normal for her.      CIWA score=n/a  NIH score=n/a normal...

## 2022-09-18 NOTE — ED QUICK NOTES
No urine from [facility] at this time. Pt family states she \"peed all over just prior to arrival.\" Pt lives at home and daughter cares for her.

## 2022-09-18 NOTE — ED QUICK NOTES
Pt returned from CT without incident. Pt lying on cart with eyes closed, skin w/d,resps appear unlabored. NS infusing per order. Family at bedside. Pt noted for approx 10 seconds to have episode of full body tremors, slow in nature, while placing pt back on heart monitor. Per daughter, pt has had several episodes such as this over the past week, longest lasting approx 20 minutes.

## 2022-09-18 NOTE — ED QUICK NOTES
Pt awake and alert, skin w/d,resps appear unlabored. NS infusing per order. Family at bedside. Per family, pt is acting more her normal self at this time. CT ready for pt.

## 2022-09-18 NOTE — ED QUICK NOTES
Pt moaning, c/o discomfort after straight cath. Pt intermittently crying. Pt in position of comfort on cart, resps reg/shallow.

## 2022-09-19 ENCOUNTER — NURSE ONLY (OUTPATIENT)
Dept: ELECTROPHYSIOLOGY | Facility: HOSPITAL | Age: 87
End: 2022-09-19
Attending: Other
Payer: MEDICARE

## 2022-09-19 LAB
ALBUMIN SERPL-MCNC: 2.8 G/DL (ref 3.4–5)
ALBUMIN/GLOB SERPL: 0.9 {RATIO} (ref 1–2)
ALP LIVER SERPL-CCNC: 71 U/L
ALT SERPL-CCNC: 24 U/L
ANION GAP SERPL CALC-SCNC: 6 MMOL/L (ref 0–18)
AST SERPL-CCNC: 23 U/L (ref 15–37)
BILIRUB SERPL-MCNC: 1 MG/DL (ref 0.1–2)
BUN BLD-MCNC: 14 MG/DL (ref 7–18)
CALCIUM BLD-MCNC: 8.8 MG/DL (ref 8.5–10.1)
CHLORIDE SERPL-SCNC: 108 MMOL/L (ref 98–112)
CK SERPL-CCNC: 45 U/L
CO2 SERPL-SCNC: 26 MMOL/L (ref 21–32)
CREAT BLD-MCNC: 0.82 MG/DL
ERYTHROCYTE [DISTWIDTH] IN BLOOD BY AUTOMATED COUNT: 13.6 %
GFR SERPLBLD BASED ON 1.73 SQ M-ARVRAT: 68 ML/MIN/1.73M2 (ref 60–?)
GLOBULIN PLAS-MCNC: 3 G/DL (ref 2.8–4.4)
GLUCOSE BLD-MCNC: 99 MG/DL (ref 70–99)
HCT VFR BLD AUTO: 42.4 %
HGB BLD-MCNC: 13.3 G/DL
MCH RBC QN AUTO: 30.4 PG (ref 26–34)
MCHC RBC AUTO-ENTMCNC: 31.4 G/DL (ref 31–37)
MCV RBC AUTO: 96.8 FL
OSMOLALITY SERPL CALC.SUM OF ELEC: 291 MOSM/KG (ref 275–295)
PLATELET # BLD AUTO: 207 10(3)UL (ref 150–450)
POTASSIUM SERPL-SCNC: 3.9 MMOL/L (ref 3.5–5.1)
PROT SERPL-MCNC: 5.8 G/DL (ref 6.4–8.2)
RBC # BLD AUTO: 4.38 X10(6)UL
SODIUM SERPL-SCNC: 140 MMOL/L (ref 136–145)
WBC # BLD AUTO: 6.1 X10(3) UL (ref 4–11)

## 2022-09-19 PROCEDURE — 99203 OFFICE O/P NEW LOW 30 MIN: CPT | Performed by: OTHER

## 2022-09-19 PROCEDURE — 95816 EEG AWAKE AND DROWSY: CPT | Performed by: OTHER

## 2022-09-19 PROCEDURE — 99225 SUBSEQUENT OBSERVATION CARE: CPT | Performed by: HOSPITALIST

## 2022-09-19 NOTE — PLAN OF CARE
Received pt at 1930  Pt AOx1-2, A paced, RA, VSS  Daughter spent the night  D/c Planning: Neuro to see. PT/OT  Call light within reach.   Needs currently met

## 2022-09-19 NOTE — PROCEDURES
Date of Procedure: 9/19/2022    Procedure: EEG (ELECTROENCEPHALOGRAM)     89 Y/O FEMALE BROUGHT IN ON 9/18 WITH C/O HAVING UNCONTROLLED \"SHAKING\" OF HER ARMS AND LEGS ONE WEEK AGO. PT WAS LUCID AND STATES SHE COULD FEEL THE EPISODE. IT STOPPED ON ITS OWN AND HAD NO FURTHER EPISODES UNTIL TODAY. PT HAD ANOTHER EPISODE IN ER, WITNESSED BY PHYSICIAN, PT REPORTEDLY SPOKE DURING EPISODE.  NO HX OF SZ, NO RECENT CHANGES IN MEDS  PMH: ARRHYTHMIA, ARTHRITIS, DEPRESSION, HTN, HL, OSTEOARTHRITIS  RX: ASPIRIN, LIPITOR, LOVENOX, TOPROL, PHENERGAN  PT STATE: A&OX3  AWAKE, DROWSY AND SLEEP    BACKGROUND ACTIVITY: Posterior rhythm was in the range of 8-10 Hz, reactive to eye opening; symmetrical and synchronous. Noted also are intermittent slowing of background activity. Drowsiness is characterized by intermittent theta waves bitemporally. Occasional sharp transients noted in posterior region. EPILEPTIFORM DISCHARGES: There were no epileptiform discharges or periodic lateralized epileptiform discharges (PLEDs) recorded throughout the recording. HYPERVENTILATION: Hyperventilation was not performed. PHOTIC STIMULATION: Photic stimulation was not performed. Stage II sleep was not reached. IMPRESSION: This is a normal awake and drowsy EEG. There were no electroclinical seizure or epileptiform discharges captured. Clinical correlation is advised.     Gabby Mora MD   Neurology  [facility]  9/19/2022, 12:19 PM  CC: Jef Slaughter MD

## 2022-09-19 NOTE — CM/SW NOTE
Department  notified of request for sophy ALYNE referrals started. Assigned CM/SW to follow up with pt/family on further discharge planning.      Rishi Arita   September 19, 2022   12:29

## 2022-09-19 NOTE — CM/SW NOTE
09/19/22 1400   CM/SW Referral Data   Referral Source Social Work (self-referral)   Reason for Referral Discharge planning   Informant Daughter   Patient Info   Patient's Current Mental Status at Time of Assessment Memory Impairments   Patient's [address]   Number of Levels in Home 3   Patient lives with Daughter   Patient Status Prior to Admission   Independent with ADLs and Mobility No   Pt. requires assistance with Housework;Meals; Finances   Services in place prior to admission DME/Supplies at home   Type of DME/Supplies Straight Cane   Discharge Needs   Anticipated D/C needs Home health care;Subacute rehab   Choice of Post-Acute Provider   Informed patient of right to choose their preferred provider Yes     MSW met with pt's dtr at bedside, pt asleep and hx of dementia. Pt from home with dtr, able to walk up spilt level stairs and use bathroom on her own, walk to/from bathroom. Dtr agreeable to discuss [facility] vs HHC and private caregiver at home. She wants to talk to her family and MSW will f/u on 9/20. Private caregiver list provided to pt's dtr.     GEORGIE referrals pending  PASRR Screen completed and loaded into [facility] referral

## 2022-09-20 LAB — AMMONIA PLAS-MCNC: 36 UMOL/L (ref 11–32)

## 2022-09-20 PROCEDURE — 90792 PSYCH DIAG EVAL W/MED SRVCS: CPT | Performed by: OTHER

## 2022-09-20 PROCEDURE — 99225 SUBSEQUENT OBSERVATION CARE: CPT | Performed by: HOSPITALIST

## 2022-09-20 RX ORDER — CLONAZEPAM 0.25 MG/1
0.25 TABLET, ORALLY DISINTEGRATING ORAL ONCE
Status: COMPLETED | OUTPATIENT
Start: 2022-09-20 | End: 2022-09-20

## 2022-09-20 RX ORDER — ONDANSETRON 2 MG/ML
4 INJECTION INTRAMUSCULAR; INTRAVENOUS EVERY 6 HOURS PRN
Status: DISCONTINUED | OUTPATIENT
Start: 2022-09-20 | End: 2022-09-21

## 2022-09-20 RX ORDER — LABETALOL HYDROCHLORIDE 5 MG/ML
10 INJECTION, SOLUTION INTRAVENOUS ONCE
Status: COMPLETED | OUTPATIENT
Start: 2022-09-20 | End: 2022-09-20

## 2022-09-20 RX ORDER — CLONAZEPAM 0.25 MG/1
0.25 TABLET, ORALLY DISINTEGRATING ORAL 2 TIMES DAILY PRN
Status: DISCONTINUED | OUTPATIENT
Start: 2022-09-20 | End: 2022-09-21

## 2022-09-20 RX ORDER — CLONAZEPAM 0.5 MG/1
0.5 TABLET ORAL 2 TIMES DAILY PRN
Status: DISCONTINUED | OUTPATIENT
Start: 2022-09-20 | End: 2022-09-21

## 2022-09-20 NOTE — PHYSICAL THERAPY NOTE
IP PT attempted, pt asleep with intermittent shaking in bed. Daughter present and requesting to let pt rest as daughter states pt has had ~1 hour episode of shaking and is \"exhausted\". Questions and concerns addressed by writer re: d/c recs, and use of cane which daughter brought today. Supine HEP provided (per daughter request) and reviewed c daughter. Educated daughter on body mechanics. Will re-attempt as schedule permits.

## 2022-09-20 NOTE — PROGRESS NOTES
PSYCH CONSULT    Date of Admission: 9/18/22  Date of Consult: 9/20/22  Reason for Consultation: Anxiety, eval for stress induced movements    Impression:  Primary Psychiatric Diagnosis:  Major neurocognitive disorder without behavioral disturbance. Moderate to severe. 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 Anxiety unspecified. Hx of major depression in 2001 after her  passed. Personality Traits:   Deferred     Pertinent Medical Diagnoses:  HTN. HLD. Recommendations:  1) Klonipin 0.25mg po x 1 now to help with anxiety and the functional movements. 2) Start Klonipin 0.25mg or 0.5mg twice a day PRN anxiety or functional shaking movements. 3) Start Lexapro 5mg daily tomorrow to help with anxiety. 4) There is no benefit from psychotherapy due to the severity of her dementia. 5) Will ask psych liaison to place referrals for Dr. Isaiah Grubbs (юляи psychiatrist) in the DC instructions. Full note to follow.     Dr. Elvis Gates

## 2022-09-20 NOTE — PLAN OF CARE
Pt is A&O x1-2. VSS- A-paced on tele. RA. Lung sounds with slight rhonchi and mild crackles to bases. Pt denies pain/SOB/headache. PIV to right AC is SL. Seizure precautions in place. Pt continues to have infrequent full body tremors. Pt remains responsive during episodes and is able to answer questions. C/o nausea and dizziness that resolve without intervention during tremor episodes. SubQ lovenox for VTE proph. PT/OT rec XI. Neuro following case. 0000- pt's BP and HR trending up. , /98. MD notified. One time dose IV labetalol given.      Problem: Patient/Family Goals  Goal: Patient/Family Long Term Goal  Description: Patient's Long Term Goal: DC planning    Interventions:  - MD follow ups  -Medication management  -Neuro following case  - See additional Care Plan goals for specific interventions  Outcome: Progressing  Goal: Patient/Family Short Term Goal  Description: Patient's Short Term Goal: Remain free of falls    Interventions:   - Seizure precautions  -Neuro following case  -safety and fall precautions  - See additional Care Plan goals for specific interventions  Outcome: Progressing     Problem: METABOLIC/FLUID AND ELECTROLYTES - ADULT  Goal: Electrolytes maintained within normal limits  Description: INTERVENTIONS:  - Monitor labs and rhythm and assess patient for signs and symptoms of electrolyte imbalances  - Administer electrolyte replacement as ordered  - Monitor response to electrolyte replacements, including rhythm and repeat lab results as appropriate  - Fluid restriction as ordered  - Instruct patient on fluid and nutrition restrictions as appropriate  Outcome: Progressing     Problem: NEUROLOGICAL - ADULT  Goal: Absence of seizures  Description: INTERVENTIONS  - Monitor for seizure activity  - Administer anti-seizure medications as ordered  - Monitor neurological status  Outcome: Progressing

## 2022-09-20 NOTE — CM/SW NOTE
Choice list provided to pt/family. Alternative plan is for pt to dc home with family and maybe family hire private caregiver to help at home. Dtr works from home.  MSW provided care giver list

## 2022-09-21 VITALS
TEMPERATURE: 99 F | BODY MASS INDEX: 26 KG/M2 | RESPIRATION RATE: 15 BRPM | OXYGEN SATURATION: 98 % | DIASTOLIC BLOOD PRESSURE: 71 MMHG | HEART RATE: 67 BPM | WEIGHT: 140 LBS | SYSTOLIC BLOOD PRESSURE: 188 MMHG

## 2022-09-21 LAB
ANION GAP SERPL CALC-SCNC: 4 MMOL/L (ref 0–18)
BUN BLD-MCNC: 23 MG/DL (ref 7–18)
CALCIUM BLD-MCNC: 8.7 MG/DL (ref 8.5–10.1)
CHLORIDE SERPL-SCNC: 106 MMOL/L (ref 98–112)
CO2 SERPL-SCNC: 27 MMOL/L (ref 21–32)
CREAT BLD-MCNC: 0.84 MG/DL
GFR SERPLBLD BASED ON 1.73 SQ M-ARVRAT: 66 ML/MIN/1.73M2 (ref 60–?)
GLUCOSE BLD-MCNC: 121 MG/DL (ref 70–99)
OSMOLALITY SERPL CALC.SUM OF ELEC: 289 MOSM/KG (ref 275–295)
POTASSIUM SERPL-SCNC: 3.9 MMOL/L (ref 3.5–5.1)
SODIUM SERPL-SCNC: 137 MMOL/L (ref 136–145)
VIT B12 SERPL-MCNC: 759 PG/ML (ref 193–986)

## 2022-09-21 PROCEDURE — 99225 SUBSEQUENT OBSERVATION CARE: CPT | Performed by: OTHER

## 2022-09-21 PROCEDURE — 99217 OBSERVATION CARE DISCHARGE: CPT | Performed by: STUDENT IN AN ORGANIZED HEALTH CARE EDUCATION/TRAINING PROGRAM

## 2022-09-21 RX ORDER — CLONAZEPAM 0.25 MG/1
0.25 TABLET, ORALLY DISINTEGRATING ORAL 3 TIMES DAILY PRN
Status: DISCONTINUED | OUTPATIENT
Start: 2022-09-21 | End: 2022-09-21

## 2022-09-21 RX ORDER — ESCITALOPRAM OXALATE 5 MG/1
5 TABLET ORAL DAILY
Status: DISCONTINUED | OUTPATIENT
Start: 2022-09-21 | End: 2022-09-21

## 2022-09-21 RX ORDER — ESCITALOPRAM OXALATE 10 MG/1
TABLET ORAL
Qty: 30 TABLET | Refills: 0 | Status: SHIPPED | OUTPATIENT
Start: 2022-09-21 | End: 2022-09-21 | Stop reason: RX

## 2022-09-21 RX ORDER — CLONAZEPAM 0.5 MG/1
0.5 TABLET ORAL 3 TIMES DAILY PRN
Status: DISCONTINUED | OUTPATIENT
Start: 2022-09-21 | End: 2022-09-21

## 2022-09-21 RX ORDER — CLONAZEPAM 0.5 MG/1
TABLET ORAL
Qty: 60 TABLET | Refills: 0 | Status: SHIPPED | OUTPATIENT
Start: 2022-09-21

## 2022-09-21 RX ORDER — TRIAMCINOLONE ACETONIDE 40 MG/ML
80 INJECTION, SUSPENSION INTRA-ARTICULAR; INTRAMUSCULAR ONCE
Status: SHIPPED | OUTPATIENT
Start: 2022-09-21

## 2022-09-21 NOTE — PLAN OF CARE
[facility] care 0730. Alert and oriented x2. SZ precautions. Deaf in right hear, [place]/hearing aid left ear. U/L dentures. RA. Lovenox for VTE prophylaxis. Tele- A paced  PRN zofran given for nausea. Electrolyte protocol- NC. Pills whole. Bed pan. Discharge plan- family refusing XI. PT to see pt to re-eval.  Discharge home with home health after eval possible. Continue to monitor pt.        Problem: METABOLIC/FLUID AND ELECTROLYTES - ADULT  Goal: Electrolytes maintained within normal limits  Description: INTERVENTIONS:  - Monitor labs and rhythm and assess patient for signs and symptoms of electrolyte imbalances  - Administer electrolyte replacement as ordered  - Monitor response to electrolyte replacements, including rhythm and repeat lab results as appropriate  - Fluid restriction as ordered  - Instruct patient on fluid and nutrition restrictions as appropriate  Outcome: Progressing     Problem: NEUROLOGICAL - ADULT  Goal: Absence of seizures  Description: INTERVENTIONS  - Monitor for seizure activity  - Administer anti-seizure medications as ordered  - Monitor neurological status  Outcome: Progressing

## 2022-09-21 NOTE — CM/SW NOTE
MSW met with pt and her dtr. Pt daughter that day rehab was all day PT program, I did update her is a few hours 2-3x a week. We discussed adult day care    Dtr wants Home with [facility].  Referrals pending

## 2022-09-21 NOTE — PLAN OF CARE
Pt is A&O x2. VSS- A-paced on tele. RA. Seizure precautions in place. Pt continues to have frequent full body tremors. Pt remains responsive during episodes and is able to answer questions. C/o nausea and dizziness that resolve without intervention during tremor episodes. Patient was started on klonopin for body shakes and they have resolved after dose given. Patient had poor appetite but after zofran, ate all of her food. Daughter at bedside.     Problem: Patient/Family Goals  Goal: Patient/Family Long Term Goal  Description: Patient's Long Term Goal: DC planning    Interventions:  - MD follow ups  -Medication management  -Neuro following case  - See additional Care Plan goals for specific interventions  Outcome: Progressing  Goal: Patient/Family Short Term Goal  Description: Patient's Short Term Goal: Remain free of falls    Interventions:   - Seizure precautions  -Neuro following case  -safety and fall precautions  - See additional Care Plan goals for specific interventions  Outcome: Progressing     Problem: METABOLIC/FLUID AND ELECTROLYTES - ADULT  Goal: Electrolytes maintained within normal limits  Description: INTERVENTIONS:  - Monitor labs and rhythm and assess patient for signs and symptoms of electrolyte imbalances  - Administer electrolyte replacement as ordered  - Monitor response to electrolyte replacements, including rhythm and repeat lab results as appropriate  - Fluid restriction as ordered  - Instruct patient on fluid and nutrition restrictions as appropriate  Outcome: Progressing     Problem: NEUROLOGICAL - ADULT  Goal: Absence of seizures  Description: INTERVENTIONS  - Monitor for seizure activity  - Administer anti-seizure medications as ordered  - Monitor neurological status  Outcome: Progressing

## 2022-09-21 NOTE — PROGRESS NOTES
NURSING DISCHARGE NOTE    Discharged Home via Wheelchair. Accompanied by Family member and Support staff  Belongings Taken by patient/family. PIV Discontinued. AVS reviewed with daughter. All questions answered. SW to follow up with dtr tomorrow regarding [address]. Commode will be sent to home in 2-3 days.

## 2022-09-21 NOTE — CM/SW NOTE
Department  notified of request for sophy Fowler referrals started. Assigned CM/SW to follow up with pt/family on further discharge planning.      Meng Sotomayor   September 21, 2022   15:29

## 2022-09-21 NOTE — PHYSICAL THERAPY NOTE
PHYSICAL THERAPY TREATMENT NOTE - INPATIENT    Room Number: 1007/2977-T     Session: 1     Number of Visits to Meet Established Goals: 5    Presenting Problem: convulsions  Co-Morbidities : dementia, HTN, pacemaker  ASSESSMENT     Pt with improved activity tolerance and increased functional mobility with gait training, RW and min A. Pt continues to demonstrate some tremors with functional mobility . Daughter present during assessment and states pt has a RW at home. Pt presents with impaired balance, impaired strength and decreased endurance , therefore , gait training with cane was not safe d/t increased risk of fall. Pt will continue to benefit from skilled therapy to maximize functional independence. Day rehab is recommended to continue to progress gait training and stair negotiation to return to [facility]. Pt would benefit from 24 hour supervision/assist , commode for home use , and pt's daughter is agreeable to have CG to assist pt. DISCHARGE RECOMMENDATIONS  PT Discharge Recommendations: Day Rehab     PLAN  PT Treatment Plan: Bed mobility; Endurance; Energy conservation;Patient education;Gait training;Range of motion;Strengthening;Transfer training;Balance training;Stair training;Stoop training  Rehab Potential : Good  Frequency (Obs): 3-5x/week    CURRENT GOALS        Goal #1 Patient is able to demonstrate supine - sit EOB @ level: modified independent      Goal #2 Patient is able to demonstrate transfers EOB to/from Chair/Wheelchair at assistance level: supervision      Goal #3 Patient is able to ambulate 50 feet with assist device: LRAD, prefer cane from home if available at assistance level: cga      Goal #4 Assess stair negotiation as appropriate.    Goal #5     Goal #6     Goal Comments: Goals established on 9/19/2022 9/21/2022 all goals ongoing    SUBJECTIVE  \"I feel nauseous\"     OBJECTIVE  Precautions: Seizure;Bed/chair alarm    WEIGHT BEARING RESTRICTION  Weight Bearing Restriction: None PAIN ASSESSMENT   Ratin  Location: pt denies pain        BALANCE                                                                                                                       Static Sitting: Fair -  Dynamic Sitting: Fair -           Static Standing: Poor +  Dynamic Standing: Poor +    ACTIVITY TOLERANCE                         O2 WALK         AM-PAC '6-Clicks' INPATIENT SHORT FORM - BASIC MOBILITY  How much difficulty does the patient currently have. .. Patient Difficulty: Turning over in bed (including adjusting bedclothes, sheets and blankets)?: A Little   Patient Difficulty: Sitting down on and standing up from a chair with arms (e.g., wheelchair, bedside commode, etc.): A Little   Patient Difficulty: Moving from lying on back to sitting on the side of the bed?: A Little   How much help from another person does the patient currently need. .. Help from Another: Moving to and from a bed to a chair (including a wheelchair)?: A Little   Help from Another: Need to walk in hospital room?: A Little   Help from Another: Climbing 3-5 steps with a railing?: Total       AM-PAC Score:  Raw Score: 16   Approx Degree of Impairment: 54.16%   Standardized Score (AM-PAC Scale): 40.78   CMS Modifier (G-Code): CK    FUNCTIONAL ABILITY STATUS  Gait Assessment   Functional Mobility/Gait Assessment  Gait Assistance: Moderate assistance;Minimum assistance  Distance (ft): 2,5,60  Assistive Device: Cane;Rolling walker  Pattern: Shuffle (tremors )    Skilled Therapy Provided  Pt presents in semi sup. Pt's Daughter present. Pt demos fair- to poor+ seated balance. However able to progress to supervision seated EOB. Attempted sit<>stand multiple times c SC and mod A, pt unsteady, able to take side steps and demos BLE BUE tremors and c/o nausea. Pt provided c RW, gait trained in room and in halls c mod A progressing to min A/CGA c RW and chair follow. Pt not safe to attempt stair training 2/2 BLE weakness.  Long d/w daughter regarding returning home with HHPT, CG service, 24 hr supervision, daughter to put bed on first floor as well as commode . Daughter agreeable to day rehab . Endorsed to SW/CORTEZ/RN. Pt left in chair, needs met, daughter present , PCT aware. Bed Mobility:  Rolling right: nt   Rolling left: nt    Supine<>Sit: supervision, HOB raised    Sit<>Supine: nt     Transfer Mobility:  Sit<>Stand: mod A progressing to min A    Stand<>Sit: CGA    Gait: mod A c SC,  progressing to min A c RW           THERAPEUTIC EXERCISES  Lower Extremity Alternating marching  Ankle pumps  Knee extension  LAQ     Upper Extremity na     Position Sitting     Repetitions   10   Sets   1     Patient End of Session: Up in chair;Needs met; With [facility] staff;Call light within reach;RN aware of session/findings; All patient questions and concerns addressed; Family present

## 2022-09-21 NOTE — PLAN OF CARE
Alert and oriented x1, knows her name and birthday. Has shaking when you move her or with activity. 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 Seizure precaution. Rails are padded. Room air, dim lungs. A paced on tele. On Lovenox for VTE. Uses the bedpan. Can feed self. Daughter at bedside.      Problem: Patient/Family Goals  Goal: Patient/Family Long Term Goal  Description: Patient's Long Term Goal: DC planning    Interventions:  - MD follow ups  -Medication management  -Neuro following case  - See additional Care Plan goals for specific interventions  Outcome: Progressing  Goal: Patient/Family Short Term Goal  Description: Patient's Short Term Goal: Remain free of falls    Interventions:   - Seizure precautions  -Neuro following case  -safety and fall precautions  - See additional Care Plan goals for specific interventions  Outcome: Progressing     Problem: METABOLIC/FLUID AND ELECTROLYTES - ADULT  Goal: Electrolytes maintained within normal limits  Description: INTERVENTIONS:  - Monitor labs and rhythm and assess patient for signs and symptoms of electrolyte imbalances  - Administer electrolyte replacement as ordered  - Monitor response to electrolyte replacements, including rhythm and repeat lab results as appropriate  - Fluid restriction as ordered  - Instruct patient on fluid and nutrition restrictions as appropriate  Outcome: Progressing     Problem: NEUROLOGICAL - ADULT  Goal: Absence of seizures  Description: INTERVENTIONS  - Monitor for seizure activity  - Administer anti-seizure medications as ordered  - Monitor neurological status  Outcome: Progressing

## 2022-09-22 ENCOUNTER — PATIENT OUTREACH (OUTPATIENT)
Dept: CASE MANAGEMENT | Age: 87
End: 2022-09-22

## 2022-09-22 DIAGNOSIS — Z02.9 ENCOUNTERS FOR ADMINISTRATIVE PURPOSE: ICD-10-CM

## 2022-09-22 PROCEDURE — 1111F DSCHRG MED/CURRENT MED MERGE: CPT

## 2022-09-22 NOTE — TELEPHONE ENCOUNTER
SALMA, Spoke to pt's daughter Zohreh Lopez for TCM today. She states that she will call back to schedule HFU after pt has some time to regain some strength and recuperate. TCM/HFU appt recommended by 9/28/2022 as pt is a high risk for readmission.       BOOK BY DATE (last date for TCM): 10/5/2022

## 2022-09-22 NOTE — CM/SW NOTE
CM spoke to pt's daughter, Andreas Infante ([phone]), via phone to discuss [facility]. Choice list sent to Andreas Infante via email ([email or web address]). DME referral deadline extended for 3-in-1 commode. CM to follow up with pt's daughter later this afternoon for Choice and update on commode. Andreas Infante also given option to go to [employer] for DME or to look on [employer] 0: Contacted pt's daughter, Andreas Infante, to follow up on Choice. Pt's dtr not reported that she has not been able to review the list and pick a [facility] agency yet today. She will review the list and leave a voicemail this evening with her decision. Referral reserved in [employer] for HME, per Josi's request. Representative at [address] [place] [address] stated that the 3-in-1 commode can be delivered as early as tomorrow afternoon. Josi updated. Will await response for [facility].      Michael Rodriguez, EDMUNDON, RN-BC    [id number]

## 2022-09-23 NOTE — CM/SW NOTE
Pt's daughter, Zohreh Lopez ([phone]), left voicemail for CM stating that she would like to choose [facility] services for the pt. Reserved [facility] in [facility] and notified agency that pt was discharged on Wednesday. AVS sent via [employer]. Zohreh Lopez stated that [facility] contacted her and will be delivering her the 3-in-1 commode today. Pt/dtr deny any additional discharge needs at this time.     Rodolfo Logan, EDMUNDON, RN-BC    [id number]

## 2022-09-28 NOTE — TELEPHONE ENCOUNTER
Called patient's daughter Ruthie Sorto after discussing plan of care with Danna RUFF. Discussed with Ruthie Sorto moving patient to the first floor to help with transporting patient accordingly. RN informed her of Palliative care and goal of Palliative Care and Ruthie Sorto states that this sounds like a good idea and wants Palliative Care ordered at this time. Patient currently receiving home health through [facility] however patient's daughter states she is ok with going through an alternative such as [facility] for Palliative services. Will discuss with Ceci Hernandez and advised Ruthie Sorto I would call her back tomorrow. Ruthie Sorto verbalized understanding. I have pended a referral for [facility] Pallative services.

## 2022-09-29 NOTE — TELEPHONE ENCOUNTER
Called [facility] and spoke with Bernie Arriaga in Palliative care and they are able to see referral and will contact Wil Feng. Spoke with daughter Wil Feng and Wil Feng states she would prefer to have all services under [facility] including Palliative. I called Sandra lester/ [facility] Palliative and left a detailed message advising this that patient currently has [facility] and prefers all care to be under [facility] including [address] [facility]

## 2022-10-03 ENCOUNTER — TELEPHONE (OUTPATIENT)
Dept: INTERNAL MEDICINE CLINIC | Facility: CLINIC | Age: 87
End: 2022-10-03

## 2022-10-03 NOTE — TELEPHONE ENCOUNTER
Verbal authorization for home health per Dr. Feliz Marquez given to Yamel Majano at [facility]. VV previously scheduled for 10/04/22 with Dr. Feliz Marquez. OV notes from 07/12/22 faxed by front office staff and received by Yamel Majano.

## 2022-10-03 NOTE — TELEPHONE ENCOUNTER
Patient has been referred for home health    Please give verbal auth for Dr. Nikita Ritchie to be signing Dr. For home health orders please advise.     Progress notes faxed from 7/12/22  Fx: [phone]

## 2022-10-04 ENCOUNTER — PATIENT MESSAGE (OUTPATIENT)
Dept: INTERNAL MEDICINE CLINIC | Facility: CLINIC | Age: 87
End: 2022-10-04

## 2022-10-04 ENCOUNTER — TELEMEDICINE (OUTPATIENT)
Dept: INTERNAL MEDICINE CLINIC | Facility: CLINIC | Age: 87
End: 2022-10-04
Payer: MEDICARE

## 2022-10-04 ENCOUNTER — MED REC SCAN ONLY (OUTPATIENT)
Dept: INTERNAL MEDICINE CLINIC | Facility: CLINIC | Age: 87
End: 2022-10-04

## 2022-10-04 DIAGNOSIS — M17.0 BILATERAL PRIMARY OSTEOARTHRITIS OF KNEE: ICD-10-CM

## 2022-10-04 DIAGNOSIS — R56.9 CONVULSIONS, UNSPECIFIED CONVULSION TYPE (HCC): ICD-10-CM

## 2022-10-04 DIAGNOSIS — Z09 HOSPITAL DISCHARGE FOLLOW-UP: Primary | ICD-10-CM

## 2022-10-04 DIAGNOSIS — F01.50 VASCULAR DEMENTIA WITHOUT BEHAVIORAL DISTURBANCE (HCC): ICD-10-CM

## 2022-10-04 DIAGNOSIS — F41.9 ANXIETY: ICD-10-CM

## 2022-10-04 PROCEDURE — 99442 PHONE E/M BY PHYS 11-20 MIN: CPT | Performed by: INTERNAL MEDICINE

## 2022-10-04 PROCEDURE — 1111F DSCHRG MED/CURRENT MED MERGE: CPT | Performed by: INTERNAL MEDICINE

## 2022-10-04 RX ORDER — CELECOXIB 100 MG/1
100 CAPSULE ORAL DAILY
Qty: 90 CAPSULE | Refills: 0 | Status: SHIPPED | OUTPATIENT
Start: 2022-10-04

## 2022-10-04 NOTE — TELEPHONE ENCOUNTER
From: Gwen Coleman  To: Dickson Littlejohn PA-C  Sent: 10/4/2022 4:23 PM CDT  Subject: Referral Request    Glad you got the message that Zoie would like to remain with her current [address] [place] [address] provider and not switch to the [place] [address] for her home health care as I previously indicated. We would just like to explore the use of [facility] for palliative care as you suggested. So just to be clear we do not want to switch providers for home health care even though previously I indicated we would switch everything to [facility]. My apologies for any confusion.

## 2022-10-04 NOTE — TELEPHONE ENCOUNTER
From: Balta Jiménez  To: Marla Kumar PA-C  Sent: 10/4/2022 1:55 PM CDT  Subject: Referral Request    I recently talked to Carolina Valdes, a nurse at your office, who was very helpful in recommending [address] for palliative care. At the time I spoke with Carolina Valdes I had only had 1 visit from Zoie's home health nurse and 1 visit from her physical therapist. Since that time Elisabet Jessica has had several more visits from the home health team and we have come to greatly value them. Therefore, we would like to keep the current provider, [address] [place] [address], for the home health care and use [facility] just for palliative care. Thank you again for your help.

## 2022-10-07 ENCOUNTER — PATIENT MESSAGE (OUTPATIENT)
Dept: INTERNAL MEDICINE CLINIC | Facility: CLINIC | Age: 87
End: 2022-10-07

## 2022-10-07 DIAGNOSIS — R60.0 BILATERAL LEG EDEMA: ICD-10-CM

## 2022-10-07 DIAGNOSIS — R60.0 ARM EDEMA: Primary | ICD-10-CM

## 2022-10-07 NOTE — TELEPHONE ENCOUNTER
From: Meseret Dejesus  To: Yeni Levin PA-C  Sent: 10/7/2022 9:45 AM CDT  Subject: New Hand Swelling    Hello,    Yesterday we noticed Zoie's left hand was swollen and the swelling has gotten worse this morning (see picture attached). It looks like it is a pitting +2-3 edema on the left hand that stops at around the wrist. She is not complaining of any pain and is using that left hand as usual. She actually sleeps on her right side so does not lay on top of it in the night. We will try and elevate it. The right hand has no swelling. We did notice some new swelling in her legs yesterday which left some indents from her socks (maybe +1 pitting) which is slightly improved this morning. She did start the celecoxib on 10/4 which has improved some of her swelling and pain in her knees. Just wanted to give an update. Is there anything else we should be doing to address this?      Thanks so much,    Abrahan Ariadna (granddaughter)

## 2022-10-10 RX ORDER — FUROSEMIDE 20 MG/1
20 TABLET ORAL DAILY
Qty: 3 TABLET | Refills: 0 | Status: SHIPPED | OUTPATIENT
Start: 2022-10-10

## 2022-10-10 NOTE — TELEPHONE ENCOUNTER
Per Dr. Rafaela Campuzano: Lasix 20 mg daily x 3 days. Eat a banana each day to prevent hypokalemia. Notified family of above plan of care and to update office on patient status after completion of course of diuretic.

## 2022-10-12 ENCOUNTER — TELEPHONE (OUTPATIENT)
Dept: SURGERY | Facility: CLINIC | Age: 87
End: 2022-10-12

## 2022-10-12 NOTE — TELEPHONE ENCOUNTER
Spoke to patients daughter and she is concerned about her mother. She was admitted to the hospital and was assessed. Patient was given anxiety medication but daughter states this is not help. The medication is making patient very weak and wants to sleep all of the time. Tremors continue and would like to know what she should do.

## 2022-10-12 NOTE — TELEPHONE ENCOUNTER
Pt daughter is calling; pt was seen in hospital and would like to update Dr Abdulkadir Quinn on how pt is doing; i told her she would need a follow up appt, but pt daughter states she only wants to ask a few questions; please advise

## 2022-10-13 NOTE — TELEPHONE ENCOUNTER
Pt offered 10/19/2022 appt at 4pm and accepted. Pt's daughter aware appt will be updated tomorrow as supervisor will need to open the slot.

## 2022-10-19 ENCOUNTER — OFFICE VISIT (OUTPATIENT)
Dept: NEUROLOGY | Facility: CLINIC | Age: 87
End: 2022-10-19
Payer: MEDICARE

## 2022-10-19 VITALS — HEART RATE: 66 BPM | DIASTOLIC BLOOD PRESSURE: 66 MMHG | SYSTOLIC BLOOD PRESSURE: 114 MMHG | RESPIRATION RATE: 16 BRPM

## 2022-10-19 DIAGNOSIS — G25.9 FUNCTIONAL MOVEMENT DISORDER: Primary | ICD-10-CM

## 2022-10-19 DIAGNOSIS — F41.9 ANXIETY: ICD-10-CM

## 2022-10-19 PROCEDURE — 1111F DSCHRG MED/CURRENT MED MERGE: CPT | Performed by: OTHER

## 2022-10-19 PROCEDURE — 99215 OFFICE O/P EST HI 40 MIN: CPT | Performed by: OTHER

## 2022-10-19 NOTE — PROGRESS NOTES
Patient's daughter states patient was put on Clonazepam for tremors but it was stopped on 10/10 as it did not reduce the tremors and she had side effects (deep sleep, no talking, wouldn't open eyes). Had been hospitalized 9/23/22 due to uncontrolled shaking. Last Saturday night she was moaning and shaking violently. Daughter held her for a few minutes and it stopped.

## 2022-11-16 RX ORDER — METOPROLOL SUCCINATE 50 MG/1
50 TABLET, EXTENDED RELEASE ORAL DAILY
Qty: 90 TABLET | Refills: 0 | Status: SHIPPED | OUTPATIENT
Start: 2022-11-16 | End: 2022-11-22

## 2022-11-16 RX ORDER — ATORVASTATIN CALCIUM 10 MG/1
10 TABLET, FILM COATED ORAL AT BEDTIME
Qty: 90 TABLET | OUTPATIENT
Start: 2022-11-16

## 2022-11-16 RX ORDER — ATORVASTATIN CALCIUM 10 MG/1
10 TABLET, FILM COATED ORAL AT BEDTIME
Qty: 30 TABLET | Refills: 0 | Status: SHIPPED | OUTPATIENT
Start: 2022-11-16 | End: 2022-11-22

## 2022-11-18 ENCOUNTER — APPOINTMENT (OUTPATIENT)
Dept: CARDIOLOGY | Age: 87
End: 2022-11-18
Attending: INTERNAL MEDICINE

## 2022-11-22 ENCOUNTER — ANCILLARY PROCEDURE (OUTPATIENT)
Dept: CARDIOLOGY | Age: 87
End: 2022-11-22
Attending: INTERNAL MEDICINE

## 2022-11-22 DIAGNOSIS — Z95.0 CARDIAC PACEMAKER: ICD-10-CM

## 2022-11-22 RX ORDER — METOPROLOL SUCCINATE 50 MG/1
50 TABLET, EXTENDED RELEASE ORAL DAILY
Qty: 90 TABLET | Refills: 0 | Status: SHIPPED | OUTPATIENT
Start: 2022-11-22 | End: 2023-05-09

## 2022-11-22 RX ORDER — ATORVASTATIN CALCIUM 10 MG/1
10 TABLET, FILM COATED ORAL AT BEDTIME
Qty: 90 TABLET | Refills: 0 | Status: SHIPPED | OUTPATIENT
Start: 2022-11-22

## 2022-11-23 ENCOUNTER — PATIENT MESSAGE (OUTPATIENT)
Dept: INTERNAL MEDICINE CLINIC | Facility: CLINIC | Age: 87
End: 2022-11-23

## 2022-11-23 ENCOUNTER — TELEPHONE (OUTPATIENT)
Dept: INTERNAL MEDICINE CLINIC | Facility: CLINIC | Age: 87
End: 2022-11-23

## 2022-11-23 DIAGNOSIS — N30.00 ACUTE CYSTITIS WITHOUT HEMATURIA: ICD-10-CM

## 2022-11-23 RX ORDER — SULFAMETHOXAZOLE AND TRIMETHOPRIM 800; 160 MG/1; MG/1
1 TABLET ORAL 2 TIMES DAILY
Qty: 10 TABLET | Refills: 0 | Status: SHIPPED | OUTPATIENT
Start: 2022-11-23

## 2022-11-23 NOTE — TELEPHONE ENCOUNTER
From: Tere Iverson  To: Deborah Harp PA-C  Sent: 11/23/2022 2:58 AM CST  Subject: Zoie UTI    I am writing because I believe my Mom, Misty Sainz, has a UTI. Her urinary frequency has increased in the last day and a half so I used an AZO UTI test strip to test her urine. The test came back negative for nitrite but positive for Leukocytes.  I am thinking that she may need an antibiotic to eliminate the UTI - please call in to [facility] on [address].   Thank you,  Jennifer Harvey

## 2022-11-23 NOTE — TELEPHONE ENCOUNTER
Pt's granddaughter is asking about the [employer] message and was asking about medication. Understand she is not on verbal release and we can talk to her mom instead, Vanita Quiles.

## 2022-11-23 NOTE — TELEPHONE ENCOUNTER
Per Yonis Donis, we can repeat Bactrim for cystitis, pt needs follow up appt that can be done in Dec or Jan  Daughter stated she will get family to help during holidays to bring pt for appt  Advised to let us know so we can order routine labs as well  dtr v/u  Rx sent

## 2022-12-01 ENCOUNTER — TELEPHONE (OUTPATIENT)
Dept: INTERNAL MEDICINE CLINIC | Facility: CLINIC | Age: 87
End: 2022-12-01

## 2022-12-01 DIAGNOSIS — N30.00 ACUTE CYSTITIS WITHOUT HEMATURIA: Primary | ICD-10-CM

## 2022-12-01 RX ORDER — NITROFURANTOIN 25; 75 MG/1; MG/1
100 CAPSULE ORAL 2 TIMES DAILY
Qty: 10 CAPSULE | Refills: 0 | Status: SHIPPED | OUTPATIENT
Start: 2022-12-01

## 2022-12-01 NOTE — TELEPHONE ENCOUNTER
Pt's daughter stated that she is still experiencing UTI symptoms, urinating many times in the night and acting confused. They took a home test and she said it showed up there is still an infection. Is wondering if they could bring a sample in?  Please advise

## 2022-12-01 NOTE — TELEPHONE ENCOUNTER
Per Dr. James Mullins, send Bactrim for UTI symptoms  Spoke with daughter, stated pt is on second round of Bactrim and still having symptoms of frequency  Advised we can send orders for UA and Cx  Daughter stated we can change antibiotics and if it still does not resolve then will do labs  Rx sent to pharmacy

## 2022-12-20 ENCOUNTER — TELEPHONE (OUTPATIENT)
Dept: INTERNAL MEDICINE CLINIC | Facility: CLINIC | Age: 87
End: 2022-12-20

## 2022-12-20 NOTE — TELEPHONE ENCOUNTER
Please franca as urgent    Continued to decline since 10/2022    Recommending hospice and daughter in agreement    Fax: [phone]

## 2023-01-04 ENCOUNTER — MED REC SCAN ONLY (OUTPATIENT)
Dept: INTERNAL MEDICINE CLINIC | Facility: CLINIC | Age: 88
End: 2023-01-04

## 2023-01-04 ENCOUNTER — TELEPHONE (OUTPATIENT)
Dept: CARDIOLOGY | Age: 88
End: 2023-01-04

## 2023-01-06 ENCOUNTER — E-ADVICE (OUTPATIENT)
Dept: CARDIOLOGY | Age: 88
End: 2023-01-06

## 2023-02-22 ENCOUNTER — TELEPHONE (OUTPATIENT)
Dept: INTERNAL MEDICINE CLINIC | Facility: CLINIC | Age: 88
End: 2023-02-22

## 2023-03-01 ENCOUNTER — ANCILLARY PROCEDURE (OUTPATIENT)
Dept: CARDIOLOGY | Age: 88
End: 2023-03-01
Attending: INTERNAL MEDICINE

## 2023-03-01 ENCOUNTER — TELEPHONE (OUTPATIENT)
Dept: CARDIOLOGY | Age: 88
End: 2023-03-01

## 2023-03-01 DIAGNOSIS — Z95.0 CARDIAC PACEMAKER: ICD-10-CM

## 2023-03-01 LAB
MDC_IDC_LEAD_IMPLANT_DT: NORMAL
MDC_IDC_LEAD_IMPLANT_DT: NORMAL
MDC_IDC_LEAD_LOCATION: NORMAL
MDC_IDC_LEAD_LOCATION: NORMAL
MDC_IDC_LEAD_LOCATION_DETAIL_1: NORMAL
MDC_IDC_LEAD_LOCATION_DETAIL_1: NORMAL
MDC_IDC_LEAD_MFG: NORMAL
MDC_IDC_LEAD_MFG: NORMAL
MDC_IDC_LEAD_MODEL: NORMAL
MDC_IDC_LEAD_MODEL: NORMAL
MDC_IDC_LEAD_POLARITY_TYPE: NORMAL
MDC_IDC_LEAD_POLARITY_TYPE: NORMAL
MDC_IDC_LEAD_SERIAL: NORMAL
MDC_IDC_LEAD_SERIAL: NORMAL
MDC_IDC_PG_IMPLANT_DTM: NORMAL
MDC_IDC_PG_MFG: NORMAL
MDC_IDC_PG_MODEL: NORMAL
MDC_IDC_PG_SERIAL: NORMAL
MDC_IDC_PG_TYPE: NORMAL
MDC_IDC_SESS_CLINIC_NAME: NORMAL
MDC_IDC_SESS_TYPE: NORMAL

## 2023-03-01 PROCEDURE — 93294 REM INTERROG EVL PM/LDLS PM: CPT | Performed by: INTERNAL MEDICINE

## 2023-05-04 NOTE — TELEPHONE ENCOUNTER
Ok to see any sooner available providers. Can put on waiting list.  Try wed 4pm or Friday noon.   thanks Unit RN to OR RN

## 2023-05-09 RX ORDER — METOPROLOL SUCCINATE 50 MG/1
50 TABLET, EXTENDED RELEASE ORAL DAILY
Qty: 90 TABLET | Refills: 0 | Status: SHIPPED | OUTPATIENT
Start: 2023-05-09

## 2023-06-07 ENCOUNTER — ANCILLARY ORDERS (OUTPATIENT)
Dept: CARDIOLOGY | Age: 88
End: 2023-06-07

## 2023-06-07 ENCOUNTER — ANCILLARY PROCEDURE (OUTPATIENT)
Dept: CARDIOLOGY | Age: 88
End: 2023-06-07
Attending: INTERNAL MEDICINE

## 2023-06-07 DIAGNOSIS — Z95.0 CARDIAC PACEMAKER IN SITU: ICD-10-CM

## 2023-06-07 PROCEDURE — 93294 REM INTERROG EVL PM/LDLS PM: CPT | Performed by: INTERNAL MEDICINE

## (undated) NOTE — MR AVS SNAPSHOT
[address] [address]               Thank you for choosing us for your health care visit with Jennifer Jimenez MD.  We are glad to serve you and happy to provide you with this zarate EXAMS :If you are breastfeeding and your exam requires an IV Contrast (Gadolinium) injection, you need to stop breastfeeding for 24-48 hours after the procedure.        IF A CHILD is scheduled for this procedure, parents should be advised that they will not Apr 18, 2017  6:00 AM   [facility] INTERVENTIONAL SUITES with [facility]    [facility].  [address] [address]   [phone]           A nurse from the Interventional Suite to obtain this authorization for your ordered radiology test.    To schedule an appointment for your radiology test please call Jamie Franco Scheduling   at [phone].        Wednesday April 05, 2017     Imaging:  MRI SPINE CERVICAL (CPT=72141 ? Written prescriptions must be picked up in office. ? Please allow the office 48-72 hours to fill the prescription. ? Patient must present photo ID at time of .       Scheduling Tests    If your physician has ordered radiology tests such as MRI o FISH OIL + D3 6826-8230 MG-UNIT Caps   Take 1 capsule by mouth daily. fluorometholone 0.1 % Susp   Place 1 drop into both eyes 2 (two) times daily. Commonly known as:   FML LIQUIFILM           magnesium oxide 400 MG Tabs   Take 400 mg by mouth Women and lighter weight persons: limit to <= 1 drink* per day. Your blood pressure indicates you may be at-risk for Hypertension. Please consider the following Lifestyle Modifications.   Also, please return for a follow-up Blood Pressure Check in

## (undated) NOTE — LETTER
22    Alejo Samaniego  : 1932      It has come to my attention that the above patient continues to decline in health and the discussion of initiation of hospice care has been discussed. I would encourage the hospice provider to take over care of the above patient, along with medication management. Please contact our office, to keep me aware, with any updates on patient's status.       Sincerely,    Dr. Wilfredo Zamudio

## (undated) NOTE — IP AVS SNAPSHOT
[facility] [place] [address] [place], [address] ~ [phone]                Discharge Summary   4/18/2017    Sarah Light           Admission Information        Provider Department    4/18/2017 Chelsey Miranda MD  8ne-A fluorometholone 0.1 % Susp   Commonly known as: [address]   Next dose due: Tomorrow morning. Place 1 drop into both eyes 2 (two) times daily.                             magnesium oxide 400 MG Tabs   Last time this was given:  400 mg on 4/19/ ·  If an ICD was inserted, call if you receive a shock. Other:     · Call for an appointment to be seen in the device clinic in 7-10 days. · A patient information booklet from the device  will be provided to you at discharge.  Frequently HgbA1C Glucose BUN Creatinine Calcium Alkaline Phosph AST    -- (02/08/17)  79 (02/08/17)  23 (H) (02/08/17)  1.07 (H) (02/08/17)  8.6 (02/07/17)  74 (02/07/17)  30      Metabolic Lab Results  (Last result in the past 90 days)    ALT Bilirubin,Total Total Visits < Visit Summaries. MyChart questions? Call [phone] for help. MyChart is NOT to be used for urgent needs.   For medical emergencies, dial 911.             _____________________________________________________________________________    [address] Fish Oil-Cholecalciferol (FISH OIL + D3) 4664-1927 MG-UNIT Oral Cap    Multiple Vitamins-Minerals (CENTRUM ULTRA WOMENS) Oral Tab    fluorometholone 0.1 % Ophthalmic Suspension

## (undated) NOTE — IP AVS SNAPSHOT
[facility] [place] [address] [facility], [address] [place] [address] Rd ~ [phone]                Discharge Summary   2/7/2017    Barrett Graham           Admission Information        Provider Department    2/7/2017 Ochoa Keen MD  0sw-A Commonly known as: FML LIQUIFILM        Place 1 drop into both eyes 2 (two) times daily. magnesium oxide 400 MG Tabs   Commonly known as:  MAG-OX        Take 400 mg by mouth daily.                               STOP taking the 0.26 (02/07/17)  0.14 (02/07/17)  1.97      Metabolic Lab Results  (Last result in the past 90 days)    HgbA1C Glucose BUN Creatinine Calcium Alkaline Phosph AST    -- (02/08/17)  79 (02/08/17)  23 (H) (02/08/17)  1.07 (H) (02/08/17)  8.6 (02/07/17)  74 (0 Now link in the [employer]. Enter your Back& Activation Code exactly as it appears below along with your Zip Code and Date of Birth to complete the sign-up process. If you do not sign up before the expiration date, you must request a new code.     Rosanna Prasad Use:  Nausea/vomiting, acid reflux, low bowel motility, stomach pain   Most common side effects:  Depends on the specific medication but generally include: diarrhea, constipation, headache, allergic reaction (itching, rash, hives)   What to report to your

## (undated) NOTE — MR AVS SNAPSHOT
[address], [address] [id number]               Thank you for choosing us for your health care visit with Emilia Meneses MD.  We are glad to serve you and happy to provide you with this zarate ? Written prescriptions must be picked up in office. ? Please allow the office 48-72 hours to fill the prescription. ? Patient must present photo ID at time of .       Scheduling Tests    If your physician has ordered radiology tests such as MRI o Take 1 capsule by mouth daily. fluorometholone 0.1 % Susp   Place 1 drop into both eyes 2 (two) times daily. Commonly known as: FML LIQUIFILM           magnesium oxide 400 MG Tabs   Take 400 mg by mouth daily.    Commonly known as:  MAG-OX Moderation of alcohol consumption Men: limit to <= 2 drinks* per day. Women and lighter weight persons: limit to <= 1 drink* per day.               Visit [facility] online at  [email or web address]